# Patient Record
Sex: MALE | Race: WHITE | Employment: OTHER | URBAN - METROPOLITAN AREA
[De-identification: names, ages, dates, MRNs, and addresses within clinical notes are randomized per-mention and may not be internally consistent; named-entity substitution may affect disease eponyms.]

---

## 2023-05-04 LAB
ALBUMIN (G/DL) IN SER/PLAS: 4 G/DL (ref 3.4–5)
ANION GAP IN SER/PLAS: 10 MMOL/L (ref 10–20)
CALCIUM (MG/DL) IN SER/PLAS: 9 MG/DL (ref 8.6–10.3)
CARBON DIOXIDE, TOTAL (MMOL/L) IN SER/PLAS: 28 MMOL/L (ref 21–32)
CHLORIDE (MMOL/L) IN SER/PLAS: 106 MMOL/L (ref 98–107)
CHOLESTEROL (MG/DL) IN SER/PLAS: 156 MG/DL (ref 0–199)
CHOLESTEROL IN HDL (MG/DL) IN SER/PLAS: 38.5 MG/DL
CHOLESTEROL/HDL RATIO: 4.1
CREATININE (MG/DL) IN SER/PLAS: 0.93 MG/DL (ref 0.5–1.3)
ESTIMATED AVERAGE GLUCOSE FOR HBA1C: 128 MG/DL
GFR MALE: 90 ML/MIN/1.73M2
GLUCOSE (MG/DL) IN SER/PLAS: 112 MG/DL (ref 74–99)
HEMOGLOBIN A1C/HEMOGLOBIN TOTAL IN BLOOD: 6.1 %
LDL: 91 MG/DL (ref 0–99)
PHOSPHATE (MG/DL) IN SER/PLAS: 2.9 MG/DL (ref 2.5–4.9)
POTASSIUM (MMOL/L) IN SER/PLAS: 4.2 MMOL/L (ref 3.5–5.3)
PROSTATE SPECIFIC AG (NG/ML) IN SER/PLAS: 0.23 NG/ML (ref 0–4)
SODIUM (MMOL/L) IN SER/PLAS: 140 MMOL/L (ref 136–145)
TRIGLYCERIDE (MG/DL) IN SER/PLAS: 132 MG/DL (ref 0–149)
UREA NITROGEN (MG/DL) IN SER/PLAS: 17 MG/DL (ref 6–23)
VLDL: 26 MG/DL (ref 0–40)

## 2023-11-27 ENCOUNTER — LAB (OUTPATIENT)
Dept: LAB | Facility: LAB | Age: 68
End: 2023-11-27
Payer: MEDICARE

## 2023-11-27 DIAGNOSIS — R73.03 PREDIABETES: ICD-10-CM

## 2023-11-27 DIAGNOSIS — I25.10 ATHEROSCLEROTIC HEART DISEASE OF NATIVE CORONARY ARTERY WITHOUT ANGINA PECTORIS: ICD-10-CM

## 2023-11-27 DIAGNOSIS — E78.2 MIXED HYPERLIPIDEMIA: ICD-10-CM

## 2023-11-27 DIAGNOSIS — I10 ESSENTIAL (PRIMARY) HYPERTENSION: Primary | ICD-10-CM

## 2023-11-27 LAB
ALBUMIN SERPL BCP-MCNC: 4.1 G/DL (ref 3.4–5)
ANION GAP SERPL CALC-SCNC: 11 MMOL/L (ref 10–20)
BUN SERPL-MCNC: 19 MG/DL (ref 6–23)
CALCIUM SERPL-MCNC: 8.9 MG/DL (ref 8.6–10.3)
CHLORIDE SERPL-SCNC: 104 MMOL/L (ref 98–107)
CHOLEST SERPL-MCNC: 170 MG/DL (ref 0–199)
CHOLESTEROL/HDL RATIO: 4.7
CO2 SERPL-SCNC: 30 MMOL/L (ref 21–32)
CREAT SERPL-MCNC: 0.84 MG/DL (ref 0.5–1.3)
EST. AVERAGE GLUCOSE BLD GHB EST-MCNC: 117 MG/DL
GFR SERPL CREATININE-BSD FRML MDRD: >90 ML/MIN/1.73M*2
GLUCOSE SERPL-MCNC: 104 MG/DL (ref 74–99)
HBA1C MFR BLD: 5.7 %
HDLC SERPL-MCNC: 36.5 MG/DL
LDLC SERPL CALC-MCNC: 87 MG/DL
NON HDL CHOLESTEROL: 134 MG/DL (ref 0–149)
PHOSPHATE SERPL-MCNC: 2.8 MG/DL (ref 2.5–4.9)
POTASSIUM SERPL-SCNC: 4.3 MMOL/L (ref 3.5–5.3)
SODIUM SERPL-SCNC: 141 MMOL/L (ref 136–145)
TRIGL SERPL-MCNC: 235 MG/DL (ref 0–149)
VLDL: 47 MG/DL (ref 0–40)

## 2023-11-27 PROCEDURE — 80061 LIPID PANEL: CPT

## 2023-11-27 PROCEDURE — 83036 HEMOGLOBIN GLYCOSYLATED A1C: CPT

## 2023-11-27 PROCEDURE — 80069 RENAL FUNCTION PANEL: CPT

## 2023-11-27 PROCEDURE — 36415 COLL VENOUS BLD VENIPUNCTURE: CPT

## 2023-12-06 ENCOUNTER — TELEPHONE (OUTPATIENT)
Dept: PRIMARY CARE | Facility: CLINIC | Age: 68
End: 2023-12-06
Payer: MEDICARE

## 2023-12-06 NOTE — TELEPHONE ENCOUNTER
Patient left voicemail stating that he received his RSV vaccine and wanted a call back to verify it is in his chart.  Patient was notified that it is in chart.

## 2024-01-08 PROBLEM — D22.9 CHANGE IN MOLE: Status: ACTIVE | Noted: 2024-01-08

## 2024-01-08 PROBLEM — E78.6 LIPOPROTEIN DEFICIENCY DISORDER: Status: ACTIVE | Noted: 2024-01-08

## 2024-01-08 PROBLEM — R94.31 ECG ABNORMAL: Status: ACTIVE | Noted: 2024-01-08

## 2024-01-08 PROBLEM — F41.9 ACUTE ANXIETY: Status: ACTIVE | Noted: 2024-01-08

## 2024-01-08 PROBLEM — M25.521 RIGHT ELBOW PAIN: Status: ACTIVE | Noted: 2024-01-08

## 2024-01-08 PROBLEM — E88.810 DYSMETABOLIC SYNDROME X: Status: ACTIVE | Noted: 2024-01-08

## 2024-01-08 PROBLEM — G47.30 SLEEP APNEA: Status: ACTIVE | Noted: 2024-01-08

## 2024-01-08 PROBLEM — Z91.199 NONCOMPLIANCE WITH THERAPEUTIC PLAN: Status: ACTIVE | Noted: 2024-01-08

## 2024-01-08 PROBLEM — R42 VERTIGO: Status: ACTIVE | Noted: 2024-01-08

## 2024-01-08 PROBLEM — M54.50 LOW BACK PAIN: Status: ACTIVE | Noted: 2024-01-08

## 2024-01-08 PROBLEM — M79.606 LOWER EXTREMITY PAIN: Status: ACTIVE | Noted: 2024-01-08

## 2024-01-08 PROBLEM — M25.579 ANKLE JOINT PAIN: Status: ACTIVE | Noted: 2024-01-08

## 2024-01-08 PROBLEM — F40.9 PHOBIA: Status: ACTIVE | Noted: 2024-01-08

## 2024-01-08 PROBLEM — E66.9 OBESE: Status: ACTIVE | Noted: 2024-01-08

## 2024-01-08 PROBLEM — M21.40 ACQUIRED PES PLANUS: Status: ACTIVE | Noted: 2024-01-08

## 2024-01-08 PROBLEM — E78.2 HYPERLIPIDEMIA, MIXED: Status: ACTIVE | Noted: 2024-01-08

## 2024-01-08 PROBLEM — F42.9 OBSESSIVE COMPULSIVE DISORDER: Status: ACTIVE | Noted: 2024-01-08

## 2024-01-08 PROBLEM — D17.9 LIPOMA: Status: ACTIVE | Noted: 2024-01-08

## 2024-01-08 PROBLEM — M67.90 TENDINOPATHY: Status: ACTIVE | Noted: 2024-01-08

## 2024-01-08 PROBLEM — I10 BENIGN ESSENTIAL HYPERTENSION: Status: ACTIVE | Noted: 2024-01-08

## 2024-01-08 PROBLEM — L91.8 SKIN TAG: Status: ACTIVE | Noted: 2024-01-08

## 2024-01-08 PROBLEM — F41.1 GENERALIZED ANXIETY DISORDER: Status: ACTIVE | Noted: 2024-01-08

## 2024-01-08 PROBLEM — M76.60 ACHILLES TENDINITIS: Status: ACTIVE | Noted: 2024-01-08

## 2024-01-08 RX ORDER — NAPROXEN SODIUM 220 MG/1
1 TABLET, FILM COATED ORAL DAILY
COMMUNITY

## 2024-01-08 RX ORDER — GLUCOSAMINE/CHONDRO SU A 500-400 MG
1 TABLET ORAL DAILY
COMMUNITY

## 2024-01-08 RX ORDER — ATORVASTATIN CALCIUM 80 MG/1
80 TABLET, FILM COATED ORAL NIGHTLY
COMMUNITY
End: 2024-03-05

## 2024-01-08 RX ORDER — METOPROLOL TARTRATE 50 MG/1
50 TABLET ORAL DAILY
COMMUNITY
Start: 2022-05-23

## 2024-01-08 RX ORDER — PERPHENAZINE/AMITRIPTYLINE HCL 2 MG-25 MG
1 TABLET ORAL DAILY
COMMUNITY
End: 2024-04-02 | Stop reason: ALTCHOICE

## 2024-01-08 RX ORDER — LISINOPRIL 5 MG/1
5 TABLET ORAL DAILY
COMMUNITY
End: 2024-01-15 | Stop reason: SDUPTHER

## 2024-01-08 RX ORDER — SERTRALINE HYDROCHLORIDE 50 MG/1
50 TABLET, FILM COATED ORAL 2 TIMES DAILY
COMMUNITY
End: 2024-03-06

## 2024-01-08 RX ORDER — NAPROXEN SODIUM 220 MG/1
TABLET ORAL
COMMUNITY

## 2024-01-15 ENCOUNTER — OFFICE VISIT (OUTPATIENT)
Dept: CARDIOLOGY | Facility: CLINIC | Age: 69
End: 2024-01-15
Payer: MEDICARE

## 2024-01-15 VITALS
BODY MASS INDEX: 36.22 KG/M2 | HEART RATE: 64 BPM | DIASTOLIC BLOOD PRESSURE: 68 MMHG | WEIGHT: 253 LBS | SYSTOLIC BLOOD PRESSURE: 142 MMHG | HEIGHT: 70 IN

## 2024-01-15 DIAGNOSIS — E08.44: ICD-10-CM

## 2024-01-15 DIAGNOSIS — I10 BENIGN ESSENTIAL HYPERTENSION: ICD-10-CM

## 2024-01-15 DIAGNOSIS — E66.09 CLASS 2 OBESITY DUE TO EXCESS CALORIES WITH BODY MASS INDEX (BMI) OF 36.0 TO 36.9 IN ADULT, UNSPECIFIED WHETHER SERIOUS COMORBIDITY PRESENT: ICD-10-CM

## 2024-01-15 DIAGNOSIS — E78.2 HYPERLIPIDEMIA, MIXED: ICD-10-CM

## 2024-01-15 DIAGNOSIS — Z12.5 SCREENING FOR PROSTATE CANCER: ICD-10-CM

## 2024-01-15 PROCEDURE — 4010F ACE/ARB THERAPY RXD/TAKEN: CPT | Performed by: INTERNAL MEDICINE

## 2024-01-15 PROCEDURE — 3008F BODY MASS INDEX DOCD: CPT | Performed by: INTERNAL MEDICINE

## 2024-01-15 PROCEDURE — 3077F SYST BP >= 140 MM HG: CPT | Performed by: INTERNAL MEDICINE

## 2024-01-15 PROCEDURE — 1159F MED LIST DOCD IN RCRD: CPT | Performed by: INTERNAL MEDICINE

## 2024-01-15 PROCEDURE — 99214 OFFICE O/P EST MOD 30 MIN: CPT | Performed by: INTERNAL MEDICINE

## 2024-01-15 PROCEDURE — 3078F DIAST BP <80 MM HG: CPT | Performed by: INTERNAL MEDICINE

## 2024-01-15 PROCEDURE — 1036F TOBACCO NON-USER: CPT | Performed by: INTERNAL MEDICINE

## 2024-01-15 RX ORDER — LISINOPRIL 10 MG/1
10 TABLET ORAL DAILY
Qty: 90 TABLET | Refills: 1 | Status: SHIPPED | OUTPATIENT
Start: 2024-01-15 | End: 2024-07-13

## 2024-01-15 RX ORDER — MELOXICAM 15 MG/1
15 TABLET ORAL DAILY
COMMUNITY
Start: 2023-12-21

## 2024-01-15 ASSESSMENT — ENCOUNTER SYMPTOMS
CARDIOVASCULAR NEGATIVE: 1
CONSTITUTIONAL NEGATIVE: 1
RESPIRATORY NEGATIVE: 1
NEUROLOGICAL NEGATIVE: 1

## 2024-01-15 NOTE — PROGRESS NOTES
A1c  Referred by Dr. Hahn ref. provider found provider found for   Chief Complaint   Patient presents with    Follow-up     6 month follow up.         History of Present Illness  Jayesh Metcalf is a 68 y.o. year old male patient with history of hypertension history of hyperlipidemia.  Doing well from a cardiac standpoint no complaint no symptoms of chest pain or shortness of breath.  Denies any symptoms of palpitations syncope or presyncope.  I discussed with the patient in length his blood pressure was elevated therefore we will increase lisinopril to 10 mg p.o. daily.  He will be checking blood pressure and report back to me.  Reviewed his labs and cholesterol is adequately controlled.  He will see me back as scheduled    Past Medical History  Past Medical History:   Diagnosis Date    Atherosclerotic heart disease of native coronary artery without angina pectoris     Coronary artery disease    Ingrowing nail     IGTN (ingrowing toe nail)    Other insomnia     Secondary insomnia    Personal history of other diseases of the circulatory system     History of intermittent claudication    Personal history of other diseases of the circulatory system     History of orthostatic hypotension    Personal history of other diseases of the digestive system     History of gastroesophageal reflux (GERD)    Personal history of other diseases of the digestive system     History of IBS    Personal history of other diseases of the digestive system     History of umbilical hernia    Personal history of other diseases of the musculoskeletal system and connective tissue     History of neck pain    Personal history of other specified conditions     History of diarrhea    Personal history of other specified conditions     History of fatigue    Personal history of other specified conditions     History of nocturia    Rash and other nonspecific skin eruption     Skin rash       Social History  Social History     Tobacco Use    Smoking status:  Never    Smokeless tobacco: Never   Substance Use Topics    Alcohol use: Not Currently    Drug use: Not on file       Family History     Family History   Problem Relation Name Age of Onset    Kidney failure Mother      Other (CABG) Father         Review of Systems  As per HPI, all other systems reviewed and negative.    Allergies:  No Known Allergies     Outpatient Medications:  Current Outpatient Medications   Medication Instructions    aspirin 81 mg chewable tablet 1 tablet, oral, Daily    atorvastatin (LIPITOR) 80 mg, oral, Nightly    flaxseed-omega3,6,9-fatty acid 1,300-670-155 mg capsule 1 capsule, oral, Daily    glucosamine-chondroitin 500-400 mg tablet 1 tablet, oral, Daily    lisinopril 5 mg, oral, Daily    meloxicam (MOBIC) 15 mg, oral, Daily    metoprolol tartrate (LOPRESSOR) 50 mg, oral, Daily    omega 3-dha-epa-fish oil (Fish OiL) 1,200 (144-216) mg capsule oral    sertraline (ZOLOFT) 50 mg, oral, 2 times daily         Vitals:  Vitals:    01/15/24 1458   BP: 150/77   Pulse: 64       Physical Exam:  Physical Exam  Constitutional:       Appearance: He is obese.   Cardiovascular:      Rate and Rhythm: Normal rate and regular rhythm.      Heart sounds: No murmur heard.  Pulmonary:      Effort: Pulmonary effort is normal.      Breath sounds: Normal breath sounds.   Skin:     General: Skin is warm and dry.   Neurological:      General: No focal deficit present.      Mental Status: He is alert and oriented to person, place, and time.       Review of Systems   Constitutional: Negative.    Respiratory: Negative.     Cardiovascular: Negative.    Neurological: Negative.          Assessment/Plan   Problem List Items Addressed This Visit             ICD-10-CM    Benign essential hypertension I10    Hyperlipidemia, mixed E78.2    Obese E66.9       Scribe Attestation  By signing my name below, Thu GRIFFITH LPN  , Scribe   attest that this documentation has been prepared under the direction and in the  presence of Maria Luisa Bryson MD.     Maria Luisa Bryson MD [facility]  Interventional Cardiology   of [facility]     Thank you for allowing me to participate in the care of this patient. Please do not hesitate to contact me with any further questions or concerns.

## 2024-01-15 NOTE — H&P (VIEW-ONLY)
A1c  Referred by Dr. Hahn ref. provider found provider found for   Chief Complaint   Patient presents with    Follow-up     6 month follow up.         History of Present Illness  Jayesh Metcalf is a 68 y.o. year old male patient with history of hypertension history of hyperlipidemia.  Doing well from a cardiac standpoint no complaint no symptoms of chest pain or shortness of breath.  Denies any symptoms of palpitations syncope or presyncope.  I discussed with the patient in length his blood pressure was elevated therefore we will increase lisinopril to 10 mg p.o. daily.  He will be checking blood pressure and report back to me.  Reviewed his labs and cholesterol is adequately controlled.  He will see me back as scheduled    Past Medical History  Past Medical History:   Diagnosis Date    Atherosclerotic heart disease of native coronary artery without angina pectoris     Coronary artery disease    Ingrowing nail     IGTN (ingrowing toe nail)    Other insomnia     Secondary insomnia    Personal history of other diseases of the circulatory system     History of intermittent claudication    Personal history of other diseases of the circulatory system     History of orthostatic hypotension    Personal history of other diseases of the digestive system     History of gastroesophageal reflux (GERD)    Personal history of other diseases of the digestive system     History of IBS    Personal history of other diseases of the digestive system     History of umbilical hernia    Personal history of other diseases of the musculoskeletal system and connective tissue     History of neck pain    Personal history of other specified conditions     History of diarrhea    Personal history of other specified conditions     History of fatigue    Personal history of other specified conditions     History of nocturia    Rash and other nonspecific skin eruption     Skin rash       Social History  Social History     Tobacco Use    Smoking status:  Never    Smokeless tobacco: Never   Substance Use Topics    Alcohol use: Not Currently    Drug use: Not on file       Family History     Family History   Problem Relation Name Age of Onset    Kidney failure Mother      Other (CABG) Father         Review of Systems  As per HPI, all other systems reviewed and negative.    Allergies:  No Known Allergies     Outpatient Medications:  Current Outpatient Medications   Medication Instructions    aspirin 81 mg chewable tablet 1 tablet, oral, Daily    atorvastatin (LIPITOR) 80 mg, oral, Nightly    flaxseed-omega3,6,9-fatty acid 1,300-670-155 mg capsule 1 capsule, oral, Daily    glucosamine-chondroitin 500-400 mg tablet 1 tablet, oral, Daily    lisinopril 5 mg, oral, Daily    meloxicam (MOBIC) 15 mg, oral, Daily    metoprolol tartrate (LOPRESSOR) 50 mg, oral, Daily    omega 3-dha-epa-fish oil (Fish OiL) 1,200 (144-216) mg capsule oral    sertraline (ZOLOFT) 50 mg, oral, 2 times daily         Vitals:  Vitals:    01/15/24 1458   BP: 150/77   Pulse: 64       Physical Exam:  Physical Exam  Constitutional:       Appearance: He is obese.   Cardiovascular:      Rate and Rhythm: Normal rate and regular rhythm.      Heart sounds: No murmur heard.  Pulmonary:      Effort: Pulmonary effort is normal.      Breath sounds: Normal breath sounds.   Skin:     General: Skin is warm and dry.   Neurological:      General: No focal deficit present.      Mental Status: He is alert and oriented to person, place, and time.       Review of Systems   Constitutional: Negative.    Respiratory: Negative.     Cardiovascular: Negative.    Neurological: Negative.          Assessment/Plan   Problem List Items Addressed This Visit             ICD-10-CM    Benign essential hypertension I10    Hyperlipidemia, mixed E78.2    Obese E66.9       Scribe Attestation  By signing my name below, Thu GRIFFITH LPN  , Scribe   attest that this documentation has been prepared under the direction and in the  presence of Maria Luisa Bryson MD.     Maria Luisa Bryson MD [facility]  Interventional Cardiology   of [facility]     Thank you for allowing me to participate in the care of this patient. Please do not hesitate to contact me with any further questions or concerns.

## 2024-02-07 ENCOUNTER — TELEPHONE (OUTPATIENT)
Dept: CARDIOLOGY | Facility: CLINIC | Age: 69
End: 2024-02-07

## 2024-02-07 ENCOUNTER — LAB (OUTPATIENT)
Dept: LAB | Facility: LAB | Age: 69
End: 2024-02-07
Payer: MEDICARE

## 2024-02-07 ENCOUNTER — PRE-ADMISSION TESTING (OUTPATIENT)
Dept: PREADMISSION TESTING | Facility: HOSPITAL | Age: 69
End: 2024-02-07
Payer: MEDICARE

## 2024-02-07 VITALS
SYSTOLIC BLOOD PRESSURE: 121 MMHG | HEART RATE: 51 BPM | WEIGHT: 238.76 LBS | DIASTOLIC BLOOD PRESSURE: 60 MMHG | OXYGEN SATURATION: 98 % | HEIGHT: 69 IN | TEMPERATURE: 97.5 F | BODY MASS INDEX: 35.36 KG/M2 | RESPIRATION RATE: 16 BRPM

## 2024-02-07 DIAGNOSIS — Z01.818 PRE-OP TESTING: ICD-10-CM

## 2024-02-07 DIAGNOSIS — Z01.818 PRE-OP TESTING: Primary | ICD-10-CM

## 2024-02-07 LAB
ANION GAP SERPL CALC-SCNC: 8 MMOL/L (ref 10–20)
BUN SERPL-MCNC: 14 MG/DL (ref 6–23)
CALCIUM SERPL-MCNC: 9.5 MG/DL (ref 8.6–10.3)
CHLORIDE SERPL-SCNC: 104 MMOL/L (ref 98–107)
CO2 SERPL-SCNC: 31 MMOL/L (ref 21–32)
CREAT SERPL-MCNC: 0.77 MG/DL (ref 0.5–1.3)
EGFRCR SERPLBLD CKD-EPI 2021: >90 ML/MIN/1.73M*2
GLUCOSE SERPL-MCNC: 101 MG/DL (ref 74–99)
POTASSIUM SERPL-SCNC: 4.5 MMOL/L (ref 3.5–5.3)
SODIUM SERPL-SCNC: 138 MMOL/L (ref 136–145)

## 2024-02-07 PROCEDURE — 80048 BASIC METABOLIC PNL TOTAL CA: CPT

## 2024-02-07 PROCEDURE — 99203 OFFICE O/P NEW LOW 30 MIN: CPT | Performed by: NURSE PRACTITIONER

## 2024-02-07 PROCEDURE — 36415 COLL VENOUS BLD VENIPUNCTURE: CPT

## 2024-02-07 ASSESSMENT — CHADS2 SCORE
HYPERTENSION: YES
AGE GREATER THAN OR EQUAL TO 75: NO
PRIOR STROKE OR TIA OR THROMBOEMBOLISM: NO
CHF: NO
CHADS2 SCORE: 1
DIABETES: NO

## 2024-02-07 ASSESSMENT — ENCOUNTER SYMPTOMS
NECK NEGATIVE: 1
NEUROLOGICAL NEGATIVE: 1
GASTROINTESTINAL NEGATIVE: 1
ENDOCRINE NEGATIVE: 1
EYES NEGATIVE: 1
CONSTITUTIONAL NEGATIVE: 1

## 2024-02-07 ASSESSMENT — DUKE ACTIVITY SCORE INDEX (DASI)
CAN YOU DO LIGHT WORK AROUND THE HOUSE LIKE DUSTING OR WASHING DISHES: YES
CAN YOU HAVE SEXUAL RELATIONS: YES
CAN YOU PARTICIPATE IN MODERATE RECREATIONAL ACTIVITIES LIKE GOLF, BOWLING, DANCING, DOUBLES TENNIS OR THROWING A BASEBALL OR FOOTBALL: YES
CAN YOU WALK INDOORS, SUCH AS AROUND YOUR HOUSE: YES
CAN YOU DO YARD WORK LIKE RAKING LEAVES, WEEDING OR PUSHING A MOWER: YES
CAN YOU RUN A SHORT DISTANCE: YES
CAN YOU CLIMB A FLIGHT OF STAIRS OR WALK UP A HILL: YES
CAN YOU DO MODERATE WORK AROUND THE HOUSE LIKE VACUUMING, SWEEPING FLOORS OR CARRYING GROCERIES: YES
TOTAL_SCORE: 50.7
DASI METS SCORE: 9
CAN YOU WALK A BLOCK OR TWO ON LEVEL GROUND: YES
CAN YOU DO HEAVY WORK AROUND THE HOUSE LIKE SCRUBBING FLOORS OR LIFTING AND MOVING HEAVY FURNITURE: YES
CAN YOU PARTICIPATE IN STRENOUS SPORTS LIKE SWIMMING, SINGLES TENNIS, FOOTBALL, BASKETBALL, OR SKIING: NO
CAN YOU TAKE CARE OF YOURSELF (EAT, DRESS, BATHE, OR USE TOILET): YES

## 2024-02-07 ASSESSMENT — LIFESTYLE VARIABLES: SMOKING_STATUS: NONSMOKER

## 2024-02-07 ASSESSMENT — ACTIVITIES OF DAILY LIVING (ADL): ADL_SCORE: 0

## 2024-02-07 ASSESSMENT — PAIN SCALES - GENERAL: PAINLEVEL_OUTOF10: 0 - NO PAIN

## 2024-02-07 ASSESSMENT — PAIN - FUNCTIONAL ASSESSMENT: PAIN_FUNCTIONAL_ASSESSMENT: 0-10

## 2024-02-07 NOTE — TELEPHONE ENCOUNTER
Patient is having a Left Arthroscopic Knee Surgery; Partial Medial Meniscectomy next Wed 2/14. During Pre-Admission testing today they told him to check with  about whether he should stop taking his Aspirin 81mg. Patient did not take his Asprin today and would like to know if he should continue to refrain from taking it until after his surgery or if he should continue taking it until closer to the surgery date.     Patient also wanted to let  know that doubling his Lisinopril dosage has been working well. BP reading taken today was 121/60, has not had a reading over 140 since change in dosage.

## 2024-02-07 NOTE — CPM/PAT H&P
CPM/PAT Evaluation       Name: Jayesh Metcalf (Jayesh Metcalf)  /Age: 1955/68 y.o.     In-Person       Chief Complaint: left knee pain    HPI  This is a very pleasant 67 yo with PMHx of HLD, GERD, HTN, and torn left knee meniscus.  Pt states he has significant knee pain off and on.  He did get some relief with Meloxicam.   He denies recent fever or chills.    Past Medical History:   Diagnosis Date    Atherosclerotic heart disease of native coronary artery without angina pectoris     Coronary artery disease    Heart disease     Ingrowing nail     IGTN (ingrowing toe nail)    Other insomnia     Secondary insomnia    Personal history of other diseases of the circulatory system     History of intermittent claudication    Personal history of other diseases of the circulatory system     History of orthostatic hypotension    Personal history of other diseases of the digestive system     History of gastroesophageal reflux (GERD)    Personal history of other diseases of the digestive system     History of IBS    Personal history of other diseases of the digestive system     History of umbilical hernia    Personal history of other diseases of the musculoskeletal system and connective tissue     History of neck pain    Personal history of other specified conditions     History of diarrhea    Personal history of other specified conditions     History of fatigue    Personal history of other specified conditions     History of nocturia    Rash and other nonspecific skin eruption     Skin rash       Past Surgical History:   Procedure Laterality Date    CARDIAC CATHETERIZATION      COLONOSCOPY      CORONARY STENT PLACEMENT      OTHER SURGICAL HISTORY  2015    Surgery    OTHER SURGICAL HISTORY  2021    Complete colonoscopy    OTHER SURGICAL HISTORY  2021    Percutaneous transluminal coronary angioplasty    OTHER SURGICAL HISTORY  2021    Umbilical hernia repair       Patient  reports being sexually  active.    Family History   Problem Relation Name Age of Onset    Kidney failure Mother      Other (CABG) Father         No Known Allergies    Prior to Admission medications    Medication Sig Start Date End Date Taking? Authorizing Provider   aspirin 81 mg chewable tablet Chew 1 tablet (81 mg) once daily.    Historical Provider, MD   atorvastatin (Lipitor) 80 mg tablet Take 1 tablet (80 mg) by mouth once daily at bedtime.    Historical Provider, MD   flaxseed-omega3,6,9-fatty acid 1,300-670-155 mg capsule Take 1 capsule by mouth once daily.    Historical Provider, MD   glucosamine-chondroitin 500-400 mg tablet Take 1 tablet by mouth once daily.    Historical Provider, MD   lisinopril 10 mg tablet Take 1 tablet (10 mg) by mouth once daily. 1/15/24 7/13/24  Maria Luisa Bryson MD   meloxicam (Mobic) 15 mg tablet Take 1 tablet (15 mg) by mouth once daily. 12/21/23   Historical Provider, MD   metoprolol tartrate (Lopressor) 50 mg tablet Take 1 tablet by mouth once daily. 5/23/22   Historical Provider, MD   multivitamin with minerals iron-free (Centrum Silver) Take 1 tablet by mouth once daily.    Historical Provider, MD   omega 3-dha-epa-fish oil (Fish OiL) 1,200 (144-216) mg capsule Take by mouth.    Historical Provider, MD   sertraline (Zoloft) 50 mg tablet Take 1 tablet (50 mg) by mouth 2 times a day.    Historical Provider, MD [place] ROS:   Constitutional:   neg    Neuro/Psych:   neg    Eyes:   neg    Ears:   neg    Nose:   neg    Mouth:   neg    Throat:   neg    Neck:   neg    Cardio:    Hx of normal Lexiscan 1/2023  Stents placed pt was age 48  Respiratory:    Possible sleep apnea pt had test in past but felt study was inaccurate pt doesn't know the results he felt like  he didn't sleep for sutdy  Endocrine:   neg    GI:   neg    :   neg    Musculoskeletal:    Left knee pain   MRI of knee shows left meniscus tear  Hematologic:   neg    Skin:  neg        Physical Exam  Constitutional:       Appearance: Normal  appearance.   HENT:      Head: Normocephalic and atraumatic.      Nose: Nose normal.      Mouth/Throat:      Mouth: Mucous membranes are moist.   Eyes:      Pupils: Pupils are equal, round, and reactive to light.   Cardiovascular:      Rate and Rhythm: Normal rate and regular rhythm.   Pulmonary:      Effort: Pulmonary effort is normal.      Breath sounds: Normal breath sounds.   Abdominal:      General: Bowel sounds are normal.      Palpations: Abdomen is soft.   Musculoskeletal:      Cervical back: Normal range of motion.      Comments: No ankle edema   Skin:     General: Skin is warm and dry.   Neurological:      General: No focal deficit present.      Mental Status: He is alert and oriented to person, place, and time.   Psychiatric:         Mood and Affect: Mood normal.         Behavior: Behavior normal.          Airway full ROM  Anesthesia:  Patient denies any anesthesia complications.   Teeth:  intact  ECG: SB rate of 47  Lab Results   Component Value Date    GLUCOSE 101 (H) 02/07/2024    CALCIUM 9.5 02/07/2024     02/07/2024    K 4.5 02/07/2024    CO2 31 02/07/2024     02/07/2024    BUN 14 02/07/2024    CREATININE 0.77 02/07/2024         Visit Vitals  /60   Pulse 51   Temp 36.4 °C (97.5 °F) (Temporal)   Resp 16       DASI Risk Score      Flowsheet Row Most Recent Value   DASI SCORE 50.7   METS Score (Will be calculated only when all the questions are answered) 9          Caprini DVT Assessment      Flowsheet Row Most Recent Value   DVT Score 5   Age 60-75 years   BMI 31-40 (Obesity)          Modified Frailty Index      Flowsheet Row Most Recent Value   Modified Frailty Index Calculator 0          CHADS2 Stroke Risk  Current as of 17 minutes ago        N/A 3 - 100%: High Risk   2 - 3%: Medium Risk   0 - 2%: Low Risk     Last Change: N/A          This score determines the patient's risk of having a stroke if the patient has atrial fibrillation.        This score is not applicable to this  patient. Components are not calculated.          Revised Cardiac Risk Index      Flowsheet Row Most Recent Value   Revised Cardiac Risk Calculator 0          Apfel Simplified Score      Flowsheet Row Most Recent Value   Apfel Simplified Score Calculator 2          Risk Analysis Index Results This Encounter         2/7/2024  0938             MUKHERJEE Cancer History: Patient does not indicate history of cancer    Total Risk Analysis Index Score Without Cancer: 23    Total Risk Analysis Index Score: 23          Stop Bang Score      Flowsheet Row Most Recent Value   Do you snore loudly? 0   Do you often feel tired or fatigued after your sleep? 0   Has anyone ever observed you stop breathing in your sleep? 0   Do you have or are you being treated for high blood pressure? 0   Recent BMI (Calculated) 36.3   Is BMI greater than 35 kg/m2? 1=Yes   Age older than 50 years old? 1=Yes   Is your neck circumference greater than 17 inches (Male) or 16 inches (Female)? 0   Gender - Male 1=Yes   STOP-BANG Total Score 3            Assessment and Plan:     Plan for OR on 2/14 for LEFT ARTHROSCOPIC KNEE SURGERY; PARTIAL MEDIAL MENISCECTOMY   BMP

## 2024-02-07 NOTE — PREPROCEDURE INSTRUCTIONS
Medication List            Accurate as of February 7, 2024 10:20 AM. Always use your most recent med list.                aspirin 81 mg chewable tablet  Notes to patient: Discuss with Dr Bryson if 7 days OK     atorvastatin 80 mg tablet  Commonly known as: Lipitor  Medication Adjustments for Surgery: Continue until night before surgery     Fish OiL 1,200 (144-216) mg capsule  Generic drug: omega 3-dha-epa-fish oil  Medication Adjustments for Surgery: Stop 7 days before surgery     flaxseed-omega3,6,9-fatty acid 1,300-670-155 mg capsule  Medication Adjustments for Surgery: Stop 7 days before surgery     glucosamine-chondroitin 500-400 mg tablet  Medication Adjustments for Surgery: Stop 7 days before surgery     lisinopril 10 mg tablet  Take 1 tablet (10 mg) by mouth once daily.  Medication Adjustments for Surgery: Stop 1 day before surgery     meloxicam 15 mg tablet  Commonly known as: Mobic  Medication Adjustments for Surgery: Stop 7 days before surgery     metoprolol tartrate 50 mg tablet  Commonly known as: Lopressor  Medication Adjustments for Surgery: Take morning of surgery with sip of water, no other fluids     multivitamin with minerals iron-free  Commonly known as: Centrum Silver  Medication Adjustments for Surgery: Stop 7 days before surgery     sertraline 50 mg tablet  Commonly known as: Zoloft  Medication Adjustments for Surgery: Take morning of surgery with sip of water, no other fluids          PRE-OPERATIVE INSTRUCTIONS    You will receive notification one business day prior to your surgery to confirm your arrival time and additional information. It is important that you answer your phone and/or check your messages during this time.    Please enter the building through the Outpatient entrance. Take the elevator off the lobby to the 2nd floor and check in at the Outpatient Surgery desk    INSTRUCTIONS:  Talk to your surgeon for instructions if you should stop your aspirin, blood thinner, or diabetes  medicines.  DO NOT take any multivitamins or over the counter supplements for 7-10 days before surgery.  If not being admitted, you must have an adult immediately available to drive you home after surgery. We also highly recommend you have someone stay with you for the entire day and night of your surgery.  For children having surgery, a parent or legal guardian must accompany them to the surgery center. If this is not possible, please call [phone] to make additional arrangements.  For adults who are unable to consent or make medical decisions for themselves, a legal guardian or Power of  must accompany them to the surgery center. If this is not possible, please call [phone] to make additional arrangements.  Wear comfortable, loose fitting clothing.  All jewelry and piercings must be removed. If you are unable to remove an item or have a dermal piercing, please be sure to tell the nurse when you arrive for surgery.  Nail polish and make-up must be removed.  Avoid smoking or consuming alcohol for 24 hours before surgery.  To help prevent infection, please take a shower/bath and wash your hair the night before and/or morning of surgery.    Additional instructions about eating and drinking before surgery:  Do not eat any solid foods after midnight. Milk, nutritional drinks/supplements, and infant formula are considered solid foods.  You may drink up to 12 oz. of clear liquids up to 2 hours before your arrival time for surgery, unless directed otherwise by your surgeon. Clear liquids include water, non-carbonated sports drinks (Gatorade), black tea or coffee (no creamers) and breast milk.    If you received a blue folder, please review additional information provided inside the folder regarding additional preparation.     If you have any questions or concerns, please call Pre-Admission Testing at [phone].    Preoperative Bathing instructions    Follow these instructions the evening before and  morning of surgery:  Do not shave the day before or day of surgery.  Remove all jewelry until after surgery. Take off rings and take out all body-piercing jewelry.  Use a clean wash cloth and towel.  Wash your face and hair with your normal soap and shampoo before you use the CHG soap.  Use a wash cloth to clean your skin with the CHG soap. Use enough CHG soap to cover the skin on your whole body. Use the same amount as you would with your normal soap.  Do not use the CHG soap on your face, eyes, ears, or head.  Do not scrub your skin too hard.  Be sure to clean the area well where surgery will be done.  Do not wash with your normal soap after the CHG soap.  Keep away from the water stream when you put CHG soap on. This keeps it from rinsing off too soon.  Rinse your body well.  Pat yourself dry with a clean, soft towel.  Put on clean clothing.  Do not put on any deodorants, lotions, or oils after showering. These might block how the CHG soap works.

## 2024-02-07 NOTE — H&P (VIEW-ONLY)
CPM/PAT Evaluation       Name: Jayesh Metcalf (Jayesh Metcalf)  /Age: 1955/68 y.o.     In-Person       Chief Complaint: left knee pain    HPI  This is a very pleasant 69 yo with PMHx of HLD, GERD, HTN, and torn left knee meniscus.  Pt states he has significant knee pain off and on.  He did get some relief with Meloxicam.   He denies recent fever or chills.    Past Medical History:   Diagnosis Date    Atherosclerotic heart disease of native coronary artery without angina pectoris     Coronary artery disease    Heart disease     Ingrowing nail     IGTN (ingrowing toe nail)    Other insomnia     Secondary insomnia    Personal history of other diseases of the circulatory system     History of intermittent claudication    Personal history of other diseases of the circulatory system     History of orthostatic hypotension    Personal history of other diseases of the digestive system     History of gastroesophageal reflux (GERD)    Personal history of other diseases of the digestive system     History of IBS    Personal history of other diseases of the digestive system     History of umbilical hernia    Personal history of other diseases of the musculoskeletal system and connective tissue     History of neck pain    Personal history of other specified conditions     History of diarrhea    Personal history of other specified conditions     History of fatigue    Personal history of other specified conditions     History of nocturia    Rash and other nonspecific skin eruption     Skin rash       Past Surgical History:   Procedure Laterality Date    CARDIAC CATHETERIZATION      COLONOSCOPY      CORONARY STENT PLACEMENT      OTHER SURGICAL HISTORY  2015    Surgery    OTHER SURGICAL HISTORY  2021    Complete colonoscopy    OTHER SURGICAL HISTORY  2021    Percutaneous transluminal coronary angioplasty    OTHER SURGICAL HISTORY  2021    Umbilical hernia repair       Patient  reports being sexually  active.    Family History   Problem Relation Name Age of Onset    Kidney failure Mother      Other (CABG) Father         No Known Allergies    Prior to Admission medications    Medication Sig Start Date End Date Taking? Authorizing Provider   aspirin 81 mg chewable tablet Chew 1 tablet (81 mg) once daily.    Historical Provider, MD   atorvastatin (Lipitor) 80 mg tablet Take 1 tablet (80 mg) by mouth once daily at bedtime.    Historical Provider, MD   flaxseed-omega3,6,9-fatty acid 1,300-670-155 mg capsule Take 1 capsule by mouth once daily.    Historical Provider, MD   glucosamine-chondroitin 500-400 mg tablet Take 1 tablet by mouth once daily.    Historical Provider, MD   lisinopril 10 mg tablet Take 1 tablet (10 mg) by mouth once daily. 1/15/24 7/13/24  Maria Luisa Bryson MD   meloxicam (Mobic) 15 mg tablet Take 1 tablet (15 mg) by mouth once daily. 12/21/23   Historical Provider, MD   metoprolol tartrate (Lopressor) 50 mg tablet Take 1 tablet by mouth once daily. 5/23/22   Historical Provider, MD   multivitamin with minerals iron-free (Centrum Silver) Take 1 tablet by mouth once daily.    Historical Provider, MD   omega 3-dha-epa-fish oil (Fish OiL) 1,200 (144-216) mg capsule Take by mouth.    Historical Provider, MD   sertraline (Zoloft) 50 mg tablet Take 1 tablet (50 mg) by mouth 2 times a day.    Historical Provider, MD [place] ROS:   Constitutional:   neg    Neuro/Psych:   neg    Eyes:   neg    Ears:   neg    Nose:   neg    Mouth:   neg    Throat:   neg    Neck:   neg    Cardio:    Hx of normal Lexiscan 1/2023  Stents placed pt was age 48  Respiratory:    Possible sleep apnea pt had test in past but felt study was inaccurate pt doesn't know the results he felt like  he didn't sleep for sutdy  Endocrine:   neg    GI:   neg    :   neg    Musculoskeletal:    Left knee pain   MRI of knee shows left meniscus tear  Hematologic:   neg    Skin:  neg        Physical Exam  Constitutional:       Appearance: Normal  appearance.   HENT:      Head: Normocephalic and atraumatic.      Nose: Nose normal.      Mouth/Throat:      Mouth: Mucous membranes are moist.   Eyes:      Pupils: Pupils are equal, round, and reactive to light.   Cardiovascular:      Rate and Rhythm: Normal rate and regular rhythm.   Pulmonary:      Effort: Pulmonary effort is normal.      Breath sounds: Normal breath sounds.   Abdominal:      General: Bowel sounds are normal.      Palpations: Abdomen is soft.   Musculoskeletal:      Cervical back: Normal range of motion.      Comments: No ankle edema   Skin:     General: Skin is warm and dry.   Neurological:      General: No focal deficit present.      Mental Status: He is alert and oriented to person, place, and time.   Psychiatric:         Mood and Affect: Mood normal.         Behavior: Behavior normal.          Airway full ROM  Anesthesia:  Patient denies any anesthesia complications.   Teeth:  intact  ECG: SB rate of 47  Lab Results   Component Value Date    GLUCOSE 101 (H) 02/07/2024    CALCIUM 9.5 02/07/2024     02/07/2024    K 4.5 02/07/2024    CO2 31 02/07/2024     02/07/2024    BUN 14 02/07/2024    CREATININE 0.77 02/07/2024         Visit Vitals  /60   Pulse 51   Temp 36.4 °C (97.5 °F) (Temporal)   Resp 16       DASI Risk Score      Flowsheet Row Most Recent Value   DASI SCORE 50.7   METS Score (Will be calculated only when all the questions are answered) 9          Caprini DVT Assessment      Flowsheet Row Most Recent Value   DVT Score 5   Age 60-75 years   BMI 31-40 (Obesity)          Modified Frailty Index      Flowsheet Row Most Recent Value   Modified Frailty Index Calculator 0          CHADS2 Stroke Risk  Current as of 17 minutes ago        N/A 3 - 100%: High Risk   2 - 3%: Medium Risk   0 - 2%: Low Risk     Last Change: N/A          This score determines the patient's risk of having a stroke if the patient has atrial fibrillation.        This score is not applicable to this  patient. Components are not calculated.          Revised Cardiac Risk Index      Flowsheet Row Most Recent Value   Revised Cardiac Risk Calculator 0          Apfel Simplified Score      Flowsheet Row Most Recent Value   Apfel Simplified Score Calculator 2          Risk Analysis Index Results This Encounter         2/7/2024  0938             MUKHERJEE Cancer History: Patient does not indicate history of cancer    Total Risk Analysis Index Score Without Cancer: 23    Total Risk Analysis Index Score: 23          Stop Bang Score      Flowsheet Row Most Recent Value   Do you snore loudly? 0   Do you often feel tired or fatigued after your sleep? 0   Has anyone ever observed you stop breathing in your sleep? 0   Do you have or are you being treated for high blood pressure? 0   Recent BMI (Calculated) 36.3   Is BMI greater than 35 kg/m2? 1=Yes   Age older than 50 years old? 1=Yes   Is your neck circumference greater than 17 inches (Male) or 16 inches (Female)? 0   Gender - Male 1=Yes   STOP-BANG Total Score 3            Assessment and Plan:     Plan for OR on 2/14 for LEFT ARTHROSCOPIC KNEE SURGERY; PARTIAL MEDIAL MENISCECTOMY   BMP

## 2024-02-14 ENCOUNTER — HOSPITAL ENCOUNTER (OUTPATIENT)
Facility: HOSPITAL | Age: 69
Setting detail: OUTPATIENT SURGERY
Discharge: HOME | End: 2024-02-14
Attending: ORTHOPAEDIC SURGERY | Admitting: ORTHOPAEDIC SURGERY
Payer: MEDICARE

## 2024-02-14 ENCOUNTER — ANESTHESIA (OUTPATIENT)
Dept: OPERATING ROOM | Facility: HOSPITAL | Age: 69
End: 2024-02-14
Payer: MEDICARE

## 2024-02-14 ENCOUNTER — ANESTHESIA EVENT (OUTPATIENT)
Dept: OPERATING ROOM | Facility: HOSPITAL | Age: 69
End: 2024-02-14
Payer: MEDICARE

## 2024-02-14 VITALS
HEIGHT: 69 IN | RESPIRATION RATE: 15 BRPM | WEIGHT: 250 LBS | HEART RATE: 68 BPM | DIASTOLIC BLOOD PRESSURE: 75 MMHG | BODY MASS INDEX: 37.03 KG/M2 | OXYGEN SATURATION: 98 % | SYSTOLIC BLOOD PRESSURE: 167 MMHG | TEMPERATURE: 97.7 F

## 2024-02-14 DIAGNOSIS — S83.242D ACUTE MEDIAL MENISCUS TEAR OF LEFT KNEE, SUBSEQUENT ENCOUNTER: Primary | ICD-10-CM

## 2024-02-14 PROBLEM — I25.10 CAD (CORONARY ARTERY DISEASE): Status: ACTIVE | Noted: 2024-02-14

## 2024-02-14 PROBLEM — Z95.5 STENTED CORONARY ARTERY: Status: ACTIVE | Noted: 2024-02-14

## 2024-02-14 PROCEDURE — 2500000005 HC RX 250 GENERAL PHARMACY W/O HCPCS: Performed by: ORTHOPAEDIC SURGERY

## 2024-02-14 PROCEDURE — 2500000005 HC RX 250 GENERAL PHARMACY W/O HCPCS: Performed by: NURSE ANESTHETIST, CERTIFIED REGISTERED

## 2024-02-14 PROCEDURE — 2500000004 HC RX 250 GENERAL PHARMACY W/ HCPCS (ALT 636 FOR OP/ED): Performed by: ORTHOPAEDIC SURGERY

## 2024-02-14 PROCEDURE — 3600000009 HC OR TIME - EACH INCREMENTAL 1 MINUTE - PROCEDURE LEVEL FOUR: Performed by: ORTHOPAEDIC SURGERY

## 2024-02-14 PROCEDURE — A29881 PR KNEE SCOPE,MED/LAT MENISECTOMY: Performed by: NURSE ANESTHETIST, CERTIFIED REGISTERED

## 2024-02-14 PROCEDURE — 3700000002 HC GENERAL ANESTHESIA TIME - EACH INCREMENTAL 1 MINUTE: Performed by: ORTHOPAEDIC SURGERY

## 2024-02-14 PROCEDURE — 2500000004 HC RX 250 GENERAL PHARMACY W/ HCPCS (ALT 636 FOR OP/ED): Performed by: NURSE ANESTHETIST, CERTIFIED REGISTERED

## 2024-02-14 PROCEDURE — 7100000001 HC RECOVERY ROOM TIME - INITIAL BASE CHARGE: Performed by: ORTHOPAEDIC SURGERY

## 2024-02-14 PROCEDURE — 2720000007 HC OR 272 NO HCPCS: Performed by: ORTHOPAEDIC SURGERY

## 2024-02-14 PROCEDURE — 7100000010 HC PHASE TWO TIME - EACH INCREMENTAL 1 MINUTE: Performed by: ORTHOPAEDIC SURGERY

## 2024-02-14 PROCEDURE — 3600000004 HC OR TIME - INITIAL BASE CHARGE - PROCEDURE LEVEL FOUR: Performed by: ORTHOPAEDIC SURGERY

## 2024-02-14 PROCEDURE — 3700000001 HC GENERAL ANESTHESIA TIME - INITIAL BASE CHARGE: Performed by: ORTHOPAEDIC SURGERY

## 2024-02-14 PROCEDURE — A29881 PR KNEE SCOPE,MED/LAT MENISECTOMY: Performed by: STUDENT IN AN ORGANIZED HEALTH CARE EDUCATION/TRAINING PROGRAM

## 2024-02-14 PROCEDURE — 7100000002 HC RECOVERY ROOM TIME - EACH INCREMENTAL 1 MINUTE: Performed by: ORTHOPAEDIC SURGERY

## 2024-02-14 PROCEDURE — 7100000009 HC PHASE TWO TIME - INITIAL BASE CHARGE: Performed by: ORTHOPAEDIC SURGERY

## 2024-02-14 RX ORDER — CEFAZOLIN SODIUM 2 G/100ML
INJECTION, SOLUTION INTRAVENOUS AS NEEDED
Status: DISCONTINUED | OUTPATIENT
Start: 2024-02-14 | End: 2024-02-14

## 2024-02-14 RX ORDER — MORPHINE SULFATE 1 MG/ML
INJECTION, SOLUTION EPIDURAL; INTRATHECAL; INTRAVENOUS AS NEEDED
Status: DISCONTINUED | OUTPATIENT
Start: 2024-02-14 | End: 2024-02-14 | Stop reason: HOSPADM

## 2024-02-14 RX ORDER — PROPOFOL 10 MG/ML
INJECTION, EMULSION INTRAVENOUS CONTINUOUS PRN
Status: DISCONTINUED | OUTPATIENT
Start: 2024-02-14 | End: 2024-02-14

## 2024-02-14 RX ORDER — CEFAZOLIN SODIUM 2 G/100ML
2 INJECTION, SOLUTION INTRAVENOUS EVERY 8 HOURS
Status: CANCELLED | OUTPATIENT
Start: 2024-02-14

## 2024-02-14 RX ORDER — HYDROMORPHONE HYDROCHLORIDE 1 MG/ML
0.5 INJECTION, SOLUTION INTRAMUSCULAR; INTRAVENOUS; SUBCUTANEOUS EVERY 5 MIN PRN
Status: DISCONTINUED | OUTPATIENT
Start: 2024-02-14 | End: 2024-02-14 | Stop reason: HOSPADM

## 2024-02-14 RX ORDER — SODIUM CHLORIDE, SODIUM LACTATE, POTASSIUM CHLORIDE, CALCIUM CHLORIDE 600; 310; 30; 20 MG/100ML; MG/100ML; MG/100ML; MG/100ML
100 INJECTION, SOLUTION INTRAVENOUS CONTINUOUS
Status: DISCONTINUED | OUTPATIENT
Start: 2024-02-14 | End: 2024-02-14 | Stop reason: HOSPADM

## 2024-02-14 RX ORDER — LIDOCAINE HYDROCHLORIDE 10 MG/ML
0.1 INJECTION, SOLUTION EPIDURAL; INFILTRATION; INTRACAUDAL; PERINEURAL ONCE
Status: DISCONTINUED | OUTPATIENT
Start: 2024-02-14 | End: 2024-02-14 | Stop reason: HOSPADM

## 2024-02-14 RX ORDER — MEPERIDINE HYDROCHLORIDE 50 MG/ML
12.5 INJECTION INTRAMUSCULAR; INTRAVENOUS; SUBCUTANEOUS EVERY 10 MIN PRN
Status: DISCONTINUED | OUTPATIENT
Start: 2024-02-14 | End: 2024-02-14 | Stop reason: HOSPADM

## 2024-02-14 RX ORDER — ONDANSETRON HYDROCHLORIDE 2 MG/ML
INJECTION, SOLUTION INTRAVENOUS AS NEEDED
Status: DISCONTINUED | OUTPATIENT
Start: 2024-02-14 | End: 2024-02-14

## 2024-02-14 RX ORDER — FENTANYL CITRATE 50 UG/ML
25 INJECTION, SOLUTION INTRAMUSCULAR; INTRAVENOUS EVERY 5 MIN PRN
Status: DISCONTINUED | OUTPATIENT
Start: 2024-02-14 | End: 2024-02-14 | Stop reason: HOSPADM

## 2024-02-14 RX ORDER — FENTANYL CITRATE 50 UG/ML
INJECTION, SOLUTION INTRAMUSCULAR; INTRAVENOUS AS NEEDED
Status: DISCONTINUED | OUTPATIENT
Start: 2024-02-14 | End: 2024-02-14

## 2024-02-14 RX ORDER — LIDOCAINE HYDROCHLORIDE 20 MG/ML
INJECTION, SOLUTION INFILTRATION; PERINEURAL AS NEEDED
Status: DISCONTINUED | OUTPATIENT
Start: 2024-02-14 | End: 2024-02-14

## 2024-02-14 RX ORDER — OXYCODONE HYDROCHLORIDE 5 MG/1
5 TABLET ORAL EVERY 4 HOURS PRN
Status: DISCONTINUED | OUTPATIENT
Start: 2024-02-14 | End: 2024-02-14 | Stop reason: HOSPADM

## 2024-02-14 RX ORDER — CELECOXIB 200 MG/1
200 CAPSULE ORAL 2 TIMES DAILY
Qty: 6 CAPSULE | Refills: 0 | Status: SHIPPED | OUTPATIENT
Start: 2024-02-14 | End: 2024-04-02 | Stop reason: ALTCHOICE

## 2024-02-14 RX ORDER — PROPOFOL 10 MG/ML
INJECTION, EMULSION INTRAVENOUS AS NEEDED
Status: DISCONTINUED | OUTPATIENT
Start: 2024-02-14 | End: 2024-02-14

## 2024-02-14 RX ORDER — LABETALOL HYDROCHLORIDE 5 MG/ML
5 INJECTION, SOLUTION INTRAVENOUS ONCE AS NEEDED
Status: DISCONTINUED | OUTPATIENT
Start: 2024-02-14 | End: 2024-02-14 | Stop reason: HOSPADM

## 2024-02-14 RX ORDER — ONDANSETRON HYDROCHLORIDE 2 MG/ML
4 INJECTION, SOLUTION INTRAVENOUS ONCE AS NEEDED
Status: DISCONTINUED | OUTPATIENT
Start: 2024-02-14 | End: 2024-02-14 | Stop reason: HOSPADM

## 2024-02-14 RX ORDER — KETOROLAC TROMETHAMINE 30 MG/ML
INJECTION, SOLUTION INTRAMUSCULAR; INTRAVENOUS AS NEEDED
Status: DISCONTINUED | OUTPATIENT
Start: 2024-02-14 | End: 2024-02-14

## 2024-02-14 RX ORDER — DEXAMETHASONE SODIUM PHOSPHATE 4 MG/ML
INJECTION, SOLUTION INTRA-ARTICULAR; INTRALESIONAL; INTRAMUSCULAR; INTRAVENOUS; SOFT TISSUE AS NEEDED
Status: DISCONTINUED | OUTPATIENT
Start: 2024-02-14 | End: 2024-02-14

## 2024-02-14 RX ORDER — PHENYLEPHRINE HCL IN 0.9% NACL 1 MG/10 ML
SYRINGE (ML) INTRAVENOUS AS NEEDED
Status: DISCONTINUED | OUTPATIENT
Start: 2024-02-14 | End: 2024-02-14

## 2024-02-14 RX ORDER — MIDAZOLAM HYDROCHLORIDE 1 MG/ML
INJECTION, SOLUTION INTRAMUSCULAR; INTRAVENOUS AS NEEDED
Status: DISCONTINUED | OUTPATIENT
Start: 2024-02-14 | End: 2024-02-14

## 2024-02-14 RX ORDER — LIDOCAINE HYDROCHLORIDE 20 MG/ML
INJECTION, SOLUTION INFILTRATION; PERINEURAL AS NEEDED
Status: DISCONTINUED | OUTPATIENT
Start: 2024-02-14 | End: 2024-02-14 | Stop reason: HOSPADM

## 2024-02-14 RX ORDER — ALBUTEROL SULFATE 0.83 MG/ML
2.5 SOLUTION RESPIRATORY (INHALATION) ONCE AS NEEDED
Status: DISCONTINUED | OUTPATIENT
Start: 2024-02-14 | End: 2024-02-14 | Stop reason: HOSPADM

## 2024-02-14 RX ORDER — CELECOXIB 200 MG/1
200 CAPSULE ORAL ONCE
Status: CANCELLED | OUTPATIENT
Start: 2024-02-14 | End: 2024-02-14

## 2024-02-14 RX ORDER — OXYCODONE AND ACETAMINOPHEN 5; 325 MG/1; MG/1
1 TABLET ORAL EVERY 6 HOURS PRN
Qty: 10 TABLET | Refills: 0 | Status: SHIPPED | OUTPATIENT
Start: 2024-02-14 | End: 2024-04-02 | Stop reason: ALTCHOICE

## 2024-02-14 RX ADMIN — PROPOFOL 50 MCG/KG/MIN: 10 INJECTION, EMULSION INTRAVENOUS at 07:55

## 2024-02-14 RX ADMIN — ONDANSETRON 4 MG: 2 INJECTION, SOLUTION INTRAMUSCULAR; INTRAVENOUS at 07:38

## 2024-02-14 RX ADMIN — FENTANYL CITRATE 50 MCG: 50 INJECTION, SOLUTION INTRAMUSCULAR; INTRAVENOUS at 07:44

## 2024-02-14 RX ADMIN — FENTANYL CITRATE 25 MCG: 50 INJECTION, SOLUTION INTRAMUSCULAR; INTRAVENOUS at 08:13

## 2024-02-14 RX ADMIN — FENTANYL CITRATE 25 MCG: 50 INJECTION, SOLUTION INTRAMUSCULAR; INTRAVENOUS at 08:17

## 2024-02-14 RX ADMIN — MIDAZOLAM 2 MG: 1 INJECTION INTRAMUSCULAR; INTRAVENOUS at 07:38

## 2024-02-14 RX ADMIN — SODIUM CHLORIDE, POTASSIUM CHLORIDE, SODIUM LACTATE AND CALCIUM CHLORIDE: 600; 310; 30; 20 INJECTION, SOLUTION INTRAVENOUS at 07:39

## 2024-02-14 RX ADMIN — DEXAMETHASONE SODIUM PHOSPHATE 4 MG: 4 INJECTION, SOLUTION INTRAMUSCULAR; INTRAVENOUS at 07:50

## 2024-02-14 RX ADMIN — Medication 100 MCG: at 07:55

## 2024-02-14 RX ADMIN — LIDOCAINE HYDROCHLORIDE 100 MG: 20 INJECTION, SOLUTION INFILTRATION; PERINEURAL at 07:44

## 2024-02-14 RX ADMIN — CEFAZOLIN SODIUM 2 G: 2 INJECTION, SOLUTION INTRAVENOUS at 07:55

## 2024-02-14 RX ADMIN — KETOROLAC TROMETHAMINE 15 MG: 30 INJECTION, SOLUTION INTRAMUSCULAR at 08:37

## 2024-02-14 RX ADMIN — Medication 100 MCG: at 08:13

## 2024-02-14 RX ADMIN — PROPOFOL 200 MG: 10 INJECTION, EMULSION INTRAVENOUS at 07:44

## 2024-02-14 RX ADMIN — PROPOFOL 30 MG: 10 INJECTION, EMULSION INTRAVENOUS at 08:22

## 2024-02-14 SDOH — HEALTH STABILITY: MENTAL HEALTH: CURRENT SMOKER: 0

## 2024-02-14 ASSESSMENT — COLUMBIA-SUICIDE SEVERITY RATING SCALE - C-SSRS
1. IN THE PAST MONTH, HAVE YOU WISHED YOU WERE DEAD OR WISHED YOU COULD GO TO SLEEP AND NOT WAKE UP?: NO
6. HAVE YOU EVER DONE ANYTHING, STARTED TO DO ANYTHING, OR PREPARED TO DO ANYTHING TO END YOUR LIFE?: NO
2. HAVE YOU ACTUALLY HAD ANY THOUGHTS OF KILLING YOURSELF?: NO

## 2024-02-14 ASSESSMENT — PAIN - FUNCTIONAL ASSESSMENT
PAIN_FUNCTIONAL_ASSESSMENT: 0-10

## 2024-02-14 ASSESSMENT — PAIN SCALES - GENERAL
PAINLEVEL_OUTOF10: 0 - NO PAIN
PAINLEVEL_OUTOF10: 3
PAINLEVEL_OUTOF10: 0 - NO PAIN

## 2024-02-14 NOTE — ANESTHESIA PREPROCEDURE EVALUATION
Patient: Jayesh Metcalf    Procedure Information       Date/Time: 02/14/24 0730    Procedure: LEFT ARTHROSCOPIC KNEE SURGERY; PARTIAL MEDIAL MENISCECTOMY (Left: Knee)    Location: [facility] OR 09 / [facility]    Surgeons: Robert Staples MD          Vitals:    02/14/24 0700   BP: (!) 178/102   Pulse: 59   Resp: 18   Temp: 36.7 °C (98.1 °F)   SpO2: 97%       Past Surgical History:   Procedure Laterality Date    CARDIAC CATHETERIZATION      COLONOSCOPY      CORONARY STENT PLACEMENT      OTHER SURGICAL HISTORY  01/13/2015    Surgery    OTHER SURGICAL HISTORY  11/20/2021    Complete colonoscopy    OTHER SURGICAL HISTORY  11/20/2021    Percutaneous transluminal coronary angioplasty    OTHER SURGICAL HISTORY  11/20/2021    Umbilical hernia repair     Past Medical History:   Diagnosis Date    Atherosclerotic heart disease of native coronary artery without angina pectoris     Coronary artery disease    Heart disease     Ingrowing nail     IGTN (ingrowing toe nail)    Other insomnia     Secondary insomnia    Personal history of other diseases of the circulatory system     History of intermittent claudication    Personal history of other diseases of the circulatory system     History of orthostatic hypotension    Personal history of other diseases of the digestive system     History of gastroesophageal reflux (GERD)    Personal history of other diseases of the digestive system     History of IBS    Personal history of other diseases of the digestive system     History of umbilical hernia    Personal history of other diseases of the musculoskeletal system and connective tissue     History of neck pain    Personal history of other specified conditions     History of diarrhea    Personal history of other specified conditions     History of fatigue    Personal history of other specified conditions     History of nocturia    Rash and other nonspecific skin eruption     Skin rash     No current facility-administered medications for  this encounter.  Prior to Admission medications    Medication Sig Start Date End Date Taking? Authorizing Provider   aspirin 81 mg chewable tablet Chew 1 tablet (81 mg) once daily.   Yes Historical Provider, MD   atorvastatin (Lipitor) 80 mg tablet Take 1 tablet (80 mg) by mouth once daily at bedtime.   Yes Historical Provider, MD   flaxseed-omega3,6,9-fatty acid 1,300-670-155 mg capsule Take 1 capsule by mouth once daily.   Yes Historical Provider, MD   glucosamine-chondroitin 500-400 mg tablet Take 1 tablet by mouth once daily.   Yes Historical Provider, MD   lisinopril 10 mg tablet Take 1 tablet (10 mg) by mouth once daily. 1/15/24 7/13/24 Yes Maria Luisa Bryson MD   metoprolol tartrate (Lopressor) 50 mg tablet Take 1 tablet by mouth once daily. 5/23/22  Yes Historical Provider, MD   multivitamin with minerals iron-free (Centrum Silver) Take 1 tablet by mouth once daily.   Yes Historical Provider, MD   omega 3-dha-epa-fish oil (Fish OiL) 1,200 (144-216) mg capsule Take by mouth.   Yes Historical Provider, MD   sertraline (Zoloft) 50 mg tablet Take 1 tablet (50 mg) by mouth 2 times a day.   Yes Historical Provider, MD   meloxicam (Mobic) 15 mg tablet Take 1 tablet (15 mg) by mouth once daily. 12/21/23   Historical Provider, MD     No Known Allergies  Social History     Tobacco Use    Smoking status: Never    Smokeless tobacco: Never   Substance Use Topics    Alcohol use: Not Currently         Chemistry    Lab Results   Component Value Date/Time     02/07/2024 1029    K 4.5 02/07/2024 1029     02/07/2024 1029    CO2 31 02/07/2024 1029    BUN 14 02/07/2024 1029    CREATININE 0.77 02/07/2024 1029    Lab Results   Component Value Date/Time    CALCIUM 9.5 02/07/2024 1029    ALKPHOS 90 11/30/2022 0959    AST 28 11/30/2022 0959    ALT 29 11/30/2022 0959    BILITOT 0.8 11/30/2022 0959          Lab Results   Component Value Date/Time    WBC 7.2 11/30/2022 0959    HGB 15.2 11/30/2022 0959    HCT 45.8 11/30/2022 0959  "    11/30/2022 0959     No results found for: \"PROTIME\", \"PTT\", \"INR\"  No results found for this or any previous visit (from the past 4464 hour(s)).     Relevant Problems   Cardiovascular   (+) Benign essential hypertension   (+) CAD (coronary artery disease)   (+) ECG abnormal   (+) Hyperlipidemia, mixed   (+) Stented coronary artery      Endocrine   (+) Obese      Neuro/Psych   (+) Acute anxiety   (+) Generalized anxiety disorder   (+) Obsessive compulsive disorder   (+) Phobia       Clinical information reviewed:   Tobacco  Allergies  Meds   Med Hx  Surg Hx   Fam Hx  Soc Hx        NPO Detail:  NPO/Void Status  Carbohydrate Drink Given Prior to Surgery? : N  Date of Last Liquid: 02/14/24  Time of Last Liquid: 0400  Date of Last Solid: 02/13/24  Time of Last Solid: 2100  Last Intake Type: Clear fluids  Time of Last Void: 0701         PHYSICAL EXAM    Anesthesia Plan    History of general anesthesia?: yes  History of complications of general anesthesia?: no    ASA 3     general     The patient is not a current smoker.  Patient was previously instructed to abstain from smoking on day of procedure.  Patient did not smoke on day of procedure.  Education provided regarding risk of obstructive sleep apnea.  intravenous induction   Postoperative administration of opioids is intended.  Anesthetic plan and risks discussed with patient.  Use of blood products discussed with patient who.    Plan discussed with CRNA.      "

## 2024-02-14 NOTE — OP NOTE
LEFT ARTHROSCOPIC KNEE SURGERY; PARTIAL MEDIAL MENISCECTOMY (L) Operative Note     Date: 2024  OR Location: [facility] OR    Name: Jayesh Metcalf : 1955, Age: 68 y.o., MRN: [id number], Sex: male    Diagnosis  * No Diagnosis Codes entered * * No Diagnosis Codes entered *     Procedures  LEFT ARTHROSCOPIC KNEE SURGERY; PARTIAL MEDIAL MENISCECTOMY  59710 - CT ARTHRS KNE SURG W/MENISCECTOMY MED/LAT W/SHVG      Surgeons      * Robert Staples - Primary    Resident/Fellow/Other Assistant:  Surgeon(s) and Role:    Procedure Summary  Anesthesia: General  ASA: III  Anesthesia Staff: Anesthesiologist: Александр López DO  CRNA: ANTHONY Grullon-CRNA  Estimated Blood Loss:  minimal  Intra-op Medications:   Administrations occurring from 0730 to 0900 on 24:   Medication Name Total Dose   lidocaine (Xylocaine) 20 mg/mL (2 %) injection 8 mL   lactated Ringer's infusion Cannot be calculated              Anesthesia Record               Intraprocedure I/O Totals          Intake    Propofol Drip 0.00 mL    The total shown is the total volume documented since Anesthesia Start was filed.    ceFAZolin in dextrose (iso-os) 2 gram/100 mL 100.00 mL    Total Intake 100 mL          Specimen: No specimens collected     Staff:   Circulator: Gretchen Suggs RN  Scrub Person: Donnie Page         Drains and/or Catheters: * None in log *    Tourniquet Times:   * Missing tourniquet times found for documented tourniquets in lo *     Implants:     Findings: Left knee posterior horn complex medial meniscus tear    Indications: Jayesh Metcalf is an 68 y.o. male who is having surgery for S83.232A - TORN MEDIAL MENISCUS, LEFT KNEE.     The patient was seen in the preoperative area. The risks, benefits, complications, treatment options, non-operative alternatives, expected recovery and outcomes were discussed with the patient. The possibilities of reaction to medication, pulmonary aspiration, injury to  surrounding structures, bleeding, recurrent infection, the need for additional procedures, failure to diagnose a condition, and creating a complication requiring transfusion or operation were discussed with the patient. The patient concurred with the proposed plan, giving informed consent.  The site of surgery was properly noted/marked if necessary per policy. The patient has been actively warmed in preoperative area. Preoperative antibiotics have been ordered and given within 1 hours of incision. Venous thrombosis prophylaxis are not indicated.    Procedure Details: Left arthroscopic knee surgery and partial medial meniscectomy.      Complications:  None; patient tolerated the procedure well.    Disposition: PACU - hemodynamically stable.  Condition: stable     This is a 68-year of age male who has had persistent left knee pain over the last 2 to 3 months.  He had mechanical symptoms.  Clinical exam and MRI were consistent with a medial meniscus tear.  Risks and benefits of nonoperative and operative treatment reviewed.  Patient wanted to proceed with an operative approach.  Left arthroscopic knee surgery partial  meniscectomy and possible meniscus repair were reviewed.  They were informed of the incisions and period of recovery.  Patient was informed of risks to include but not limited to persistent pain, recurrent tear, infection, wound complications, arthrofibrosis, DVT, pulmonary embolus, neurovascular injury, complex regional pain syndrome, medical and anesthetic risk.  In addition they were informed of the potential risk of exposure to COVID-19 and the potential medical complications.  All questions were answered and consent was obtained.    Patient was brought to the operating room where after induction of general anesthesia while supine on the operating table exam under anesthesia was undertaken.  Patient had moderate evidence of effusion.  There was normal tracking the patella.  Range of motion was 0-135  degrees.  Patient had negative anterior drawer, negative Lachmans.  Patient had no evidence of medial, lateral, posterior or posterior lateral instability.  Left lower extremity was prepped and draped in usual sterile fashion.  Patient was given 2 g of cefazolin for prophylactic antibiotics.  Timeout was taken confirming surgical site, procedure, instrumentation, fire risk, and antibiotics.  Sterile Esmarch was used to exsanguinate the left lower extremity.  Tourniquet was inflated to 300 mmHg.  60 cc of sterile saline introduced into the knee joint.15 blade was used to make standard anterior medial and anterior lateral portals.  The arthroscope was inserted through the anterolateral portal into the suprapatellar pouch.  Under direct vision a medial egress needle was placed for outflow.  Arthroscopic evaluation demonstrated no evidence of loose bodies or other pathology within the pouch of the gutters.  The patella was normally centered.  There is grade 3 chondral changes in the trochlea.  The notch demonstrated intact anterior cruciate ligament and posterior cruciate ligament.  There grade 2 chondral changes in the medial femoral condyle and medial tibial plateau demonstrated grade 3 chondral changes.  The medial compartment demonstrated evidence of complex medial meniscus tear.   There was a complex tear of the posterior horn of the medial meniscus.  This involved approximately 30 to 40% of the posterior horn of the  meniscus.  Using arthroscopic biter and shaver partial medial meniscectomy was completed.  A stable meniscal rim was obtained.  The lateral compartment demonstrated no evidence of tear of the lateral meniscus.  There is no evidence of degenerative changes within the lateral compartment.    Final arthroscopic images were taken.  The knee was copiously irrigated through inflow outflow cannulas.  Arthroscopic instruments were removed.  Portals closed with 4-0 Monocryl subcuticular suture.  Bacitracin  Adaptic sterile dressings were applied.  All sponge and the counts were correct at the end the case.          Attending Attestation:     Robert Staples  Phone Number: [phone]

## 2024-02-14 NOTE — ANESTHESIA PROCEDURE NOTES
Airway  Date/Time: 2/14/2024 7:47 AM  Urgency: elective      Staffing  Performed: CRNA   Authorized by: Александр López DO    Performed by: ANTHONY Grullon-REFUGIO  Patient location during procedure: OR    Indications and Patient Condition  Indications for airway management: anesthesia  Spontaneous Ventilation: absent  Sedation level: deep  Preoxygenated: yes  Patient position: sniffing  Mask difficulty assessment: 0 - not attempted    Final Airway Details  Final airway type: supraglottic airway      Successful airway: Supraglottic airway: IGEL.  Size 4     Number of attempts at approach: 1

## 2024-02-14 NOTE — DISCHARGE INSTRUCTIONS
Post operative instructions:  Knee arthroscopy     Use your crutches while you have a limp.  This usually lasts anywhere from 1 day to 1 week.    When you get rid of your limp you can get rid of your crutches.  2.  You may put as much weight on your leg as the knee will tolerate.    3.  Apply ice for swelling and discomfort; 20 minutes on and 20 minutes off.  4.  You may range the knee from extension to flexion without limitation.  5.  Your follow-up appointment should be at 2 weeks.  6.  Remove the bulky dressing after 72 hours.  Use Band-Aids or a small gauze pad to protect the incision sites.  The Ace wrap is used as needed to help control swelling.  Always wrap from the calf toward the thigh.  7.  Keep the incisions clean and dry for 3 days.  Once the dressing is removed you may take a shower, but do not submerge the leg in a bathtub, whirlpool, swimming pool etc.  8.  Please contact the surgeons office for any questions or concerns at [phone].

## 2024-02-14 NOTE — ANESTHESIA POSTPROCEDURE EVALUATION
Patient: Jayesh Metcalf    Procedure Summary       Date: 02/14/24 Room / Location: [facility] OR 09 / [facility]    Anesthesia Start: 0741 Anesthesia Stop: 0849    Procedure: LEFT ARTHROSCOPIC KNEE SURGERY; PARTIAL MEDIAL MENISCECTOMY (Left: Knee) Diagnosis: (S83.232A - TORN MEDIAL MENISCUS, LEFT KNEE)    Surgeons: Robert Staples MD Responsible Provider: Александр López DO    Anesthesia Type: general ASA Status: 3            Anesthesia Type: general    Vitals Value Taken Time   /82 02/14/24 0845   Temp 36.2 02/14/24 0849   Pulse 60 02/14/24 0846   Resp 13 02/14/24 0846   SpO2 99 % 02/14/24 0846   Vitals shown include unvalidated device data.    Anesthesia Post Evaluation    Patient location during evaluation: PACU  Patient participation: complete - patient cannot participate  Level of consciousness: sedated  Pain management: adequate  Airway patency: patent (OPA in place)  Cardiovascular status: acceptable  Respiratory status: acceptable, nonlabored ventilation, oral airway, face mask and spontaneous ventilation  Hydration status: acceptable  Postoperative Nausea and Vomiting: none  Comments: Handoff to PACU RN        No notable events documented.

## 2024-03-04 DIAGNOSIS — F40.9 PHOBIA, UNSPECIFIED TYPE: Primary | ICD-10-CM

## 2024-03-04 DIAGNOSIS — E78.2 HYPERLIPIDEMIA, MIXED: Primary | ICD-10-CM

## 2024-03-05 RX ORDER — ATORVASTATIN CALCIUM 80 MG/1
80 TABLET, FILM COATED ORAL NIGHTLY
Qty: 90 TABLET | Refills: 3 | Status: SHIPPED | OUTPATIENT
Start: 2024-03-05

## 2024-03-06 RX ORDER — SERTRALINE HYDROCHLORIDE 50 MG/1
50 TABLET, FILM COATED ORAL 2 TIMES DAILY
Qty: 60 TABLET | Refills: 1 | Status: SHIPPED | OUTPATIENT
Start: 2024-03-06 | End: 2024-04-02 | Stop reason: SDUPTHER

## 2024-04-02 ENCOUNTER — OFFICE VISIT (OUTPATIENT)
Dept: PRIMARY CARE | Facility: CLINIC | Age: 69
End: 2024-04-02
Payer: MEDICARE

## 2024-04-02 VITALS
WEIGHT: 249.6 LBS | DIASTOLIC BLOOD PRESSURE: 80 MMHG | BODY MASS INDEX: 36.97 KG/M2 | HEIGHT: 69 IN | HEART RATE: 54 BPM | SYSTOLIC BLOOD PRESSURE: 137 MMHG

## 2024-04-02 DIAGNOSIS — E78.2 HYPERLIPIDEMIA, MIXED: Primary | ICD-10-CM

## 2024-04-02 DIAGNOSIS — G89.29 CHRONIC PAIN OF LEFT KNEE: ICD-10-CM

## 2024-04-02 DIAGNOSIS — F40.9 PHOBIA, UNSPECIFIED TYPE: ICD-10-CM

## 2024-04-02 DIAGNOSIS — I10 BENIGN ESSENTIAL HYPERTENSION: ICD-10-CM

## 2024-04-02 DIAGNOSIS — E88.810 DYSMETABOLIC SYNDROME X: ICD-10-CM

## 2024-04-02 DIAGNOSIS — I25.10 CORONARY ARTERY DISEASE INVOLVING NATIVE CORONARY ARTERY OF NATIVE HEART WITHOUT ANGINA PECTORIS: ICD-10-CM

## 2024-04-02 DIAGNOSIS — G47.33 OBSTRUCTIVE SLEEP APNEA SYNDROME: ICD-10-CM

## 2024-04-02 DIAGNOSIS — Z13.29 SCREENING FOR THYROID DISORDER: ICD-10-CM

## 2024-04-02 DIAGNOSIS — M25.562 CHRONIC PAIN OF LEFT KNEE: ICD-10-CM

## 2024-04-02 DIAGNOSIS — Z12.5 PROSTATE CANCER SCREENING: ICD-10-CM

## 2024-04-02 DIAGNOSIS — Z13.21 ENCOUNTER FOR VITAMIN DEFICIENCY SCREENING: ICD-10-CM

## 2024-04-02 PROBLEM — E66.01 OBESITY, MORBID (MULTI): Status: ACTIVE | Noted: 2024-01-08

## 2024-04-02 PROBLEM — E66.01 OBESITY, MORBID (MULTI): Status: RESOLVED | Noted: 2024-01-08 | Resolved: 2024-04-02

## 2024-04-02 PROCEDURE — 99214 OFFICE O/P EST MOD 30 MIN: CPT | Performed by: INTERNAL MEDICINE

## 2024-04-02 PROCEDURE — 1159F MED LIST DOCD IN RCRD: CPT | Performed by: INTERNAL MEDICINE

## 2024-04-02 PROCEDURE — 3008F BODY MASS INDEX DOCD: CPT | Performed by: INTERNAL MEDICINE

## 2024-04-02 PROCEDURE — 3079F DIAST BP 80-89 MM HG: CPT | Performed by: INTERNAL MEDICINE

## 2024-04-02 PROCEDURE — 1036F TOBACCO NON-USER: CPT | Performed by: INTERNAL MEDICINE

## 2024-04-02 PROCEDURE — 1160F RVW MEDS BY RX/DR IN RCRD: CPT | Performed by: INTERNAL MEDICINE

## 2024-04-02 PROCEDURE — 3075F SYST BP GE 130 - 139MM HG: CPT | Performed by: INTERNAL MEDICINE

## 2024-04-02 RX ORDER — SERTRALINE HYDROCHLORIDE 50 MG/1
50 TABLET, FILM COATED ORAL 2 TIMES DAILY
Qty: 180 TABLET | Refills: 3 | Status: SHIPPED | OUTPATIENT
Start: 2024-04-02 | End: 2025-04-02

## 2024-04-02 ASSESSMENT — LIFESTYLE VARIABLES
HAVE YOU OR SOMEONE ELSE BEEN INJURED AS A RESULT OF YOUR DRINKING: NO
AUDIT TOTAL SCORE: 0
HOW OFTEN DO YOU HAVE SIX OR MORE DRINKS ON ONE OCCASION: NEVER
SKIP TO QUESTIONS 9-10: 1
HOW OFTEN DO YOU HAVE A DRINK CONTAINING ALCOHOL: NEVER
AUDIT-C TOTAL SCORE: 0
HAS A RELATIVE, FRIEND, DOCTOR, OR ANOTHER HEALTH PROFESSIONAL EXPRESSED CONCERN ABOUT YOUR DRINKING OR SUGGESTED YOU CUT DOWN: NO
HOW MANY STANDARD DRINKS CONTAINING ALCOHOL DO YOU HAVE ON A TYPICAL DAY: PATIENT DOES NOT DRINK

## 2024-04-02 ASSESSMENT — PATIENT HEALTH QUESTIONNAIRE - PHQ9
1. LITTLE INTEREST OR PLEASURE IN DOING THINGS: NOT AT ALL
SUM OF ALL RESPONSES TO PHQ9 QUESTIONS 1 AND 2: 0
2. FEELING DOWN, DEPRESSED OR HOPELESS: NOT AT ALL

## 2024-04-02 NOTE — PROGRESS NOTES
Assessment/Plan   68 years old male with a history of coronary artery disease has no symptoms of angina and he follows up with the cardiologist regularly.    His blood pressure is well-controlled.    Patient has hyperlipidemia and his last blood test shows elevated triglycerides.  He understands importance of having optimal cholesterol control.  He is on atorvastatin 80 mg and tolerating it well.    Patient has history of depression and also phobia which is stable.  He will continue the sertraline 50 mg twice a day and a prescription was sent to the pharmacy for a year as per his request.    Preventive care were reviewed and will have the blood test as ordered and approved by his insurance.  Could not do the TSH since it is not covered by the insurance.  We discussed about his twice urination at nights and it is not significantly abnormal and his last PSA was acceptable about 11 months ago.  He will have a repeat PSA and if there is any change in his urination he understands to call me.    As per the patient's request he will follow-up with me in 1 years time.  He says his cardiologist takes care of him regularly and he is comfortable with it.      Problem List Items Addressed This Visit       Benign essential hypertension    Relevant Orders    CBC and Auto Differential    Comprehensive Metabolic Panel    Dysmetabolic syndrome X    Hyperlipidemia, mixed - Primary    Relevant Orders    CBC and Auto Differential    Comprehensive Metabolic Panel    Phobia    Relevant Medications    sertraline (Zoloft) 50 mg tablet    Sleep apnea    CAD (coronary artery disease)    Chronic pain of left knee     Other Visit Diagnoses       Prostate cancer screening        Relevant Orders    Prostate Specific Antigen, Screen    Screening for thyroid disorder        Encounter for vitamin deficiency screening        Relevant Orders    Vitamin D 25-Hydroxy,Total (for eval of Vitamin D levels)    Body mass index (BMI) 32.0-32.9, adult         Relevant Orders    Vitamin D 25-Hydroxy,Total (for eval of Vitamin D levels)            Subjective     Patient ID: Jayesh Metcalf is a 68 y.o. male who presents for Med Refill.    History of present illness  68 years old male with history of coronary artery disease follows with the cardiologist regularly came for a regular follow-up.  He has been on sertraline for several years and he says he is doing well.  His phobia is stable.  He denies any suicidal homicidal ideation.  He says he has dreams almost every day but not nightmare.    He is having problems with his knees.  He has had left knee procedures done for meniscus and he is seeing the orthopedic today because he has continued discomfort in the left knee.  No swelling.  No history of trauma.  He also has mild discomfort in the right knee.    He is complaining of right leg pain mostly from the lower back to the right knee.  He thinks it may be radiating pain but mostly feels like muscle spasms.    Because of his knee pain he has not been exercising as usual he does.  But he does ride the bicycle and also goes to gym.  He admits that he has gained weight.    He is taking his medications as prescribed by the cardiologist for his blood pressure and cardiac condition.    He has urination at nights twice.  But he does not have any burning urination and no frequency during the daytime.    He is up-to-date with the colonoscopy.    He denies any significant snoring and he says he does not want to do the sleep study again.  ROS  Rest of the review of systems no acute complaints    Family History   Problem Relation Name Age of Onset    Kidney failure Mother      Other (CABG) Father        Social History     Socioeconomic History    Marital status: Single     Spouse name: Not on file    Number of children: Not on file    Years of education: Not on file    Highest education level: Not on file   Occupational History    Not on file   Tobacco Use    Smoking status: Former      Types: Cigarettes     Quit date:      Years since quittin.2    Smokeless tobacco: Never   Vaping Use    Vaping Use: Never used   Substance and Sexual Activity    Alcohol use: Never    Drug use: Never    Sexual activity: Yes   Other Topics Concern    Not on file   Social History Narrative    Not on file     Social Determinants of Health     Financial Resource Strain: Not on file   Food Insecurity: Not on file   Transportation Needs: Not on file   Physical Activity: Not on file   Stress: Not on file   Social Connections: Not on file   Intimate Partner Violence: Not on file   Housing Stability: Not on file      Patient has no known allergies.   Current Outpatient Medications   Medication Sig Dispense Refill    aspirin 81 mg chewable tablet Chew 1 tablet (81 mg) once daily.      atorvastatin (Lipitor) 80 mg tablet TAKE 1 TABLET BY MOUTH EVERYDAY AT BEDTIME 90 tablet 3    glucosamine-chondroitin 500-400 mg tablet Take 1 tablet by mouth once daily.      lisinopril 10 mg tablet Take 1 tablet (10 mg) by mouth once daily. 90 tablet 1    meloxicam (Mobic) 15 mg tablet Take 1 tablet (15 mg) by mouth once daily.      metoprolol tartrate (Lopressor) 50 mg tablet Take 1 tablet by mouth once daily.      multivitamin with minerals iron-free (Centrum Silver) Take 1 tablet by mouth once daily.      omega 3-dha-epa-fish oil (Fish OiL) 1,200 (144-216) mg capsule Take by mouth.      sertraline (Zoloft) 50 mg tablet Take 1 tablet (50 mg) by mouth 2 times a day. 180 tablet 3     No current facility-administered medications for this visit.       Objective     Vitals:    24 0849   BP: 137/80   Pulse: 54        Physical Exam    Moderate obese, well-nourished  with no apparent distress. Alert oriented  Skin:  Normal turgor.  No rash.  Head:  Normocephalic, atraumatic.  Eyes:  Pupils are equal, round,.  No pallor of conjunctivae.  Mouth has moist oral mucosa.  Neck:  Supple.  No JVD.  No carotid bruit.  No thyromegaly. No  cervical lymphadenopathy.  No clubbing  Chest:  Vesicular breathing Bilaterally good air entry and bilaterally clear to auscultation.  No wheezing.  No crackles.  Heart:  Regular rate and rhythm.  S1, S2 positive.  No murmur.  Abdomen:  Soft and nontender.  Bowel sounds are positive.  No organomegaly.  Extremities:  Left knee has no swelling and a detailed exam deferred since he is seeing the orthopedic today.  Bilaterally no pedal pitting edema.  Bilaterally 2+ dorsalis pedis pulses.  No calf tenderness. Homans sign is negative.  Neuro Exam: No focal signs. Gait is normal.    Problem List Items Addressed This Visit       Benign essential hypertension    Relevant Orders    CBC and Auto Differential    Comprehensive Metabolic Panel    Dysmetabolic syndrome X    Hyperlipidemia, mixed - Primary    Relevant Orders    CBC and Auto Differential    Comprehensive Metabolic Panel    Phobia    Relevant Medications    sertraline (Zoloft) 50 mg tablet    Sleep apnea    CAD (coronary artery disease)    Chronic pain of left knee     Other Visit Diagnoses       Prostate cancer screening        Relevant Orders    Prostate Specific Antigen, Screen    Screening for thyroid disorder        Encounter for vitamin deficiency screening        Relevant Orders    Vitamin D 25-Hydroxy,Total (for eval of Vitamin D levels)    Body mass index (BMI) 32.0-32.9, adult        Relevant Orders    Vitamin D 25-Hydroxy,Total (for eval of Vitamin D levels)             Orders Placed This Encounter   Procedures    Prostate Specific Antigen, Screen     Standing Status:   Future     Standing Expiration Date:   4/2/2025     Order Specific Question:   Release result to [facility]     Answer:   Immediate [1]    CBC and Auto Differential     Standing Status:   Future     Standing Expiration Date:   4/2/2025     Order Specific Question:   Release result to [facility]     Answer:   Immediate    Comprehensive Metabolic Panel     Standing Status:   Future     Standing  Expiration Date:   4/2/2025     Order Specific Question:   Release result to [facility]     Answer:   Immediate    Vitamin D 25-Hydroxy,Total (for eval of Vitamin D levels)     Standing Status:   Future     Standing Expiration Date:   4/2/2025     Order Specific Question:   [facility]     Answer:   Immediate        Lab Results   Component Value Date    WBC 7.2 11/30/2022    HGB 15.2 11/30/2022    HCT 45.8 11/30/2022     11/30/2022    CHOL 170 11/27/2023    TRIG 235 (H) 11/27/2023    HDL 36.5 11/27/2023    ALT 29 11/30/2022    AST 28 11/30/2022     02/07/2024    K 4.5 02/07/2024     02/07/2024    CREATININE 0.77 02/07/2024    BUN 14 02/07/2024    CO2 31 02/07/2024    TSH 0.95 11/30/2022    PSA 0.23 05/04/2023    HGBA1C 5.7 (H) 11/27/2023     Lab Results   Component Value Date    CHOL 170 11/27/2023    LDLCALC 87 11/27/2023    CHHDL 4.7 11/27/2023       No results found.

## 2024-04-15 ENCOUNTER — PATIENT OUTREACH (OUTPATIENT)
Dept: CARE COORDINATION | Facility: CLINIC | Age: 69
End: 2024-04-15
Payer: MEDICARE

## 2024-04-15 NOTE — PROGRESS NOTES
Date: 04/15/24    Dear Jayesh Metcalf    Our records indicate that you are due for the following appointment or test: Annual Wellness Visit    Please call our office at your earliest convenience. We can assist you with scheduling an appointment or test.  If you have already scheduled an appointment or have completed testing, please disregard this letter.    Sincerely,    Ximena Santana    [place] [address]    Office Phone: [phone]  Patient Navigator: [phone]

## 2024-04-23 ENCOUNTER — APPOINTMENT (OUTPATIENT)
Dept: PRIMARY CARE | Facility: CLINIC | Age: 69
End: 2024-04-23
Payer: MEDICARE

## 2024-06-18 ENCOUNTER — TELEPHONE (OUTPATIENT)
Dept: PRIMARY CARE | Facility: CLINIC | Age: 69
End: 2024-06-18
Payer: MEDICARE

## 2024-06-28 ENCOUNTER — LAB (OUTPATIENT)
Dept: LAB | Facility: LAB | Age: 69
End: 2024-06-28
Payer: MEDICARE

## 2024-06-28 DIAGNOSIS — E78.2 HYPERLIPIDEMIA, MIXED: ICD-10-CM

## 2024-06-28 DIAGNOSIS — Z12.5 PROSTATE CANCER SCREENING: ICD-10-CM

## 2024-06-28 DIAGNOSIS — I10 BENIGN ESSENTIAL HYPERTENSION: ICD-10-CM

## 2024-06-28 DIAGNOSIS — Z13.21 ENCOUNTER FOR VITAMIN DEFICIENCY SCREENING: ICD-10-CM

## 2024-06-28 DIAGNOSIS — Z12.5 SCREENING FOR PROSTATE CANCER: ICD-10-CM

## 2024-06-28 DIAGNOSIS — E08.44: ICD-10-CM

## 2024-06-28 LAB
25(OH)D3 SERPL-MCNC: 33 NG/ML (ref 30–100)
ALBUMIN SERPL BCP-MCNC: 4.3 G/DL (ref 3.4–5)
ALP SERPL-CCNC: 83 U/L (ref 33–136)
ALT SERPL W P-5'-P-CCNC: 20 U/L (ref 10–52)
ANION GAP SERPL CALC-SCNC: 12 MMOL/L (ref 10–20)
AST SERPL W P-5'-P-CCNC: 23 U/L (ref 9–39)
BASOPHILS # BLD AUTO: 0.1 X10*3/UL (ref 0–0.1)
BASOPHILS NFR BLD AUTO: 1.6 %
BILIRUB SERPL-MCNC: 0.5 MG/DL (ref 0–1.2)
BUN SERPL-MCNC: 20 MG/DL (ref 6–23)
CALCIUM SERPL-MCNC: 9 MG/DL (ref 8.6–10.3)
CHLORIDE SERPL-SCNC: 106 MMOL/L (ref 98–107)
CHOLEST SERPL-MCNC: 159 MG/DL (ref 0–199)
CHOLESTEROL/HDL RATIO: 3.7
CO2 SERPL-SCNC: 25 MMOL/L (ref 21–32)
CREAT SERPL-MCNC: 0.85 MG/DL (ref 0.5–1.3)
EGFRCR SERPLBLD CKD-EPI 2021: >90 ML/MIN/1.73M*2
EOSINOPHIL # BLD AUTO: 0.28 X10*3/UL (ref 0–0.7)
EOSINOPHIL NFR BLD AUTO: 4.4 %
ERYTHROCYTE [DISTWIDTH] IN BLOOD BY AUTOMATED COUNT: 12.4 % (ref 11.5–14.5)
EST. AVERAGE GLUCOSE BLD GHB EST-MCNC: 126 MG/DL
GLUCOSE SERPL-MCNC: 106 MG/DL (ref 74–99)
HBA1C MFR BLD: 6 %
HCT VFR BLD AUTO: 44.1 % (ref 41–52)
HDLC SERPL-MCNC: 43.2 MG/DL
HGB BLD-MCNC: 14.9 G/DL (ref 13.5–17.5)
IMM GRANULOCYTES # BLD AUTO: 0.02 X10*3/UL (ref 0–0.7)
IMM GRANULOCYTES NFR BLD AUTO: 0.3 % (ref 0–0.9)
LDLC SERPL CALC-MCNC: 95 MG/DL
LYMPHOCYTES # BLD AUTO: 1.92 X10*3/UL (ref 1.2–4.8)
LYMPHOCYTES NFR BLD AUTO: 30 %
MCH RBC QN AUTO: 30.8 PG (ref 26–34)
MCHC RBC AUTO-ENTMCNC: 33.8 G/DL (ref 32–36)
MCV RBC AUTO: 91 FL (ref 80–100)
MONOCYTES # BLD AUTO: 0.61 X10*3/UL (ref 0.1–1)
MONOCYTES NFR BLD AUTO: 9.5 %
NEUTROPHILS # BLD AUTO: 3.48 X10*3/UL (ref 1.2–7.7)
NEUTROPHILS NFR BLD AUTO: 54.2 %
NON HDL CHOLESTEROL: 116 MG/DL (ref 0–149)
NRBC BLD-RTO: 0 /100 WBCS (ref 0–0)
PHOSPHATE SERPL-MCNC: 3.3 MG/DL (ref 2.5–4.9)
PLATELET # BLD AUTO: 252 X10*3/UL (ref 150–450)
POTASSIUM SERPL-SCNC: 4.2 MMOL/L (ref 3.5–5.3)
PROT SERPL-MCNC: 7 G/DL (ref 6.4–8.2)
PSA SERPL-MCNC: 0.35 NG/ML
PSA SERPL-MCNC: 0.35 NG/ML
RBC # BLD AUTO: 4.83 X10*6/UL (ref 4.5–5.9)
SODIUM SERPL-SCNC: 139 MMOL/L (ref 136–145)
TRIGL SERPL-MCNC: 105 MG/DL (ref 0–149)
VLDL: 21 MG/DL (ref 0–40)
WBC # BLD AUTO: 6.4 X10*3/UL (ref 4.4–11.3)

## 2024-06-28 PROCEDURE — 84100 ASSAY OF PHOSPHORUS: CPT

## 2024-06-28 PROCEDURE — G0103 PSA SCREENING: HCPCS

## 2024-06-28 PROCEDURE — 36415 COLL VENOUS BLD VENIPUNCTURE: CPT

## 2024-06-28 PROCEDURE — 85025 COMPLETE CBC W/AUTO DIFF WBC: CPT

## 2024-06-28 PROCEDURE — 83036 HEMOGLOBIN GLYCOSYLATED A1C: CPT

## 2024-06-28 PROCEDURE — 80061 LIPID PANEL: CPT

## 2024-06-28 PROCEDURE — 80053 COMPREHEN METABOLIC PANEL: CPT

## 2024-06-28 PROCEDURE — 82306 VITAMIN D 25 HYDROXY: CPT

## 2024-06-29 NOTE — RESULT ENCOUNTER NOTE
Labs acceptable but there are a few abnormalities similar to previous labs.  Please advise patient to continue the same medications and follow-up to review in detail and discuss the management options during the office appointment.  Please make sure the patient understands and he will follow-up within next at least 2 to 3 months.

## 2024-07-01 ENCOUNTER — TELEPHONE (OUTPATIENT)
Dept: PRIMARY CARE | Facility: CLINIC | Age: 69
End: 2024-07-01
Payer: MEDICARE

## 2024-07-01 NOTE — TELEPHONE ENCOUNTER
----- Message from Jefferson Ortez MD sent at 6/29/2024 12:12 PM EDT -----  Labs acceptable but there are a few abnormalities similar to previous labs.  Please advise patient to continue the same medications and follow-up to review in detail and discuss the management options during the office appointment.  Please make sure the patient understands and he will follow-up within next at least 2 to 3 months.

## 2024-07-06 DIAGNOSIS — I10 ESSENTIAL (PRIMARY) HYPERTENSION: Primary | ICD-10-CM

## 2024-07-06 DIAGNOSIS — I10 BENIGN ESSENTIAL HYPERTENSION: ICD-10-CM

## 2024-07-08 DIAGNOSIS — I10 BENIGN ESSENTIAL HYPERTENSION: ICD-10-CM

## 2024-07-08 RX ORDER — METOPROLOL TARTRATE 50 MG/1
50 TABLET ORAL DAILY
Qty: 90 TABLET | Refills: 3 | Status: SHIPPED | OUTPATIENT
Start: 2024-07-08

## 2024-07-08 RX ORDER — LISINOPRIL 5 MG/1
5 TABLET ORAL DAILY
Qty: 90 TABLET | Refills: 3 | Status: SHIPPED | OUTPATIENT
Start: 2024-07-08

## 2024-07-12 RX ORDER — LISINOPRIL 10 MG/1
10 TABLET ORAL DAILY
Qty: 90 TABLET | Refills: 3 | Status: SHIPPED | OUTPATIENT
Start: 2024-07-12

## 2024-07-15 ENCOUNTER — APPOINTMENT (OUTPATIENT)
Dept: CARDIOLOGY | Facility: CLINIC | Age: 69
End: 2024-07-15
Payer: MEDICARE

## 2024-07-15 VITALS
BODY MASS INDEX: 36.23 KG/M2 | HEART RATE: 69 BPM | TEMPERATURE: 97.3 F | HEIGHT: 69 IN | SYSTOLIC BLOOD PRESSURE: 108 MMHG | DIASTOLIC BLOOD PRESSURE: 70 MMHG | WEIGHT: 244.6 LBS

## 2024-07-15 DIAGNOSIS — I25.10 CORONARY ARTERY DISEASE INVOLVING NATIVE CORONARY ARTERY OF NATIVE HEART WITHOUT ANGINA PECTORIS: ICD-10-CM

## 2024-07-15 DIAGNOSIS — R73.9 BLOOD GLUCOSE ELEVATED: ICD-10-CM

## 2024-07-15 DIAGNOSIS — Z87.891 FORMER SMOKER: ICD-10-CM

## 2024-07-15 DIAGNOSIS — E78.2 HYPERLIPIDEMIA, MIXED: ICD-10-CM

## 2024-07-15 DIAGNOSIS — I10 BENIGN ESSENTIAL HYPERTENSION: ICD-10-CM

## 2024-07-15 PROCEDURE — 1159F MED LIST DOCD IN RCRD: CPT | Performed by: INTERNAL MEDICINE

## 2024-07-15 PROCEDURE — 3078F DIAST BP <80 MM HG: CPT | Performed by: INTERNAL MEDICINE

## 2024-07-15 PROCEDURE — 3008F BODY MASS INDEX DOCD: CPT | Performed by: INTERNAL MEDICINE

## 2024-07-15 PROCEDURE — 1036F TOBACCO NON-USER: CPT | Performed by: INTERNAL MEDICINE

## 2024-07-15 PROCEDURE — 3074F SYST BP LT 130 MM HG: CPT | Performed by: INTERNAL MEDICINE

## 2024-07-15 PROCEDURE — 99213 OFFICE O/P EST LOW 20 MIN: CPT | Performed by: INTERNAL MEDICINE

## 2024-07-15 RX ORDER — PHENYLPROPANOLAMINE/CLEMASTINE 75-1.34MG
TABLET, EXTENDED RELEASE ORAL
COMMUNITY

## 2024-07-15 ASSESSMENT — ENCOUNTER SYMPTOMS
RESPIRATORY NEGATIVE: 1
CARDIOVASCULAR NEGATIVE: 1
CONSTITUTIONAL NEGATIVE: 1
NEUROLOGICAL NEGATIVE: 1

## 2024-07-15 NOTE — PROGRESS NOTES
Referred by Dr. Hahn ref. provider found provider found for   Chief Complaint   Patient presents with    Follow-up        History of Present Illness  Jayesh Metcalf is a 69 y.o. year old male patient is here for follow-up.  Doing well from cardiac standpoint no complaint no symptoms of chest pain or shortness of breath.  Denies any symptoms of palpitations syncope or presyncope.  I discussed with the patient Kneifl continue medication will obtain lab work will follow-up as scheduled    Past Medical History  Past Medical History:   Diagnosis Date    Atherosclerotic heart disease of native coronary artery without angina pectoris     Coronary artery disease    Heart disease     Ingrowing nail     IGTN (ingrowing toe nail)    Other insomnia     Secondary insomnia    Personal history of other diseases of the circulatory system     History of intermittent claudication    Personal history of other diseases of the circulatory system     History of orthostatic hypotension    Personal history of other diseases of the digestive system     History of gastroesophageal reflux (GERD)    Personal history of other diseases of the digestive system     History of IBS    Personal history of other diseases of the digestive system     History of umbilical hernia    Personal history of other diseases of the musculoskeletal system and connective tissue     History of neck pain    Personal history of other specified conditions     History of diarrhea    Personal history of other specified conditions     History of fatigue    Personal history of other specified conditions     History of nocturia    Rash and other nonspecific skin eruption     Skin rash       Social History  Social History     Tobacco Use    Smoking status: Former     Current packs/day: 0.00     Types: Cigarettes     Quit date:      Years since quittin.5    Smokeless tobacco: Never   Vaping Use    Vaping status: Never Used   Substance Use Topics    Alcohol use: Not  Currently    Drug use: Never       Family History     Family History   Problem Relation Name Age of Onset    Kidney failure Mother      Other (CABG) Father         Review of Systems  As per HPI, all other systems reviewed and negative.    Allergies:  No Known Allergies     Outpatient Medications:  Current Outpatient Medications   Medication Instructions    aspirin 81 mg chewable tablet 1 tablet, oral, Daily    atorvastatin (LIPITOR) 80 mg, oral, Nightly    glucosamine-chondroitin 500-400 mg tablet 1 tablet, oral, Daily    ibuprofen (Motrin) capsule oral    lisinopril 5 mg, oral, Daily    lisinopril 10 mg, oral, Daily    meloxicam (MOBIC) 15 mg, oral, Daily    metoprolol tartrate (LOPRESSOR) 50 mg, oral, Daily    multivitamin with minerals iron-free (Centrum Silver) 1 tablet, oral, Daily    omega 3-dha-epa-fish oil (Fish OiL) 1,200 (144-216) mg capsule oral    sertraline (ZOLOFT) 50 mg, oral, 2 times daily         Vitals:  Vitals:    07/15/24 1419   BP: 108/70   Pulse: 69   Temp: 36.3 °C (97.3 °F)       Physical Exam:  Physical Exam  Constitutional:       Appearance: Normal appearance. He is obese.   Neck:      Vascular: No carotid bruit.   Cardiovascular:      Rate and Rhythm: Normal rate and regular rhythm.      Pulses: Normal pulses.      Heart sounds: No murmur heard.  Pulmonary:      Effort: Pulmonary effort is normal.      Breath sounds: Normal breath sounds.   Musculoskeletal:         General: No swelling.   Skin:     General: Skin is warm and dry.   Neurological:      General: No focal deficit present.      Mental Status: He is alert and oriented to person, place, and time.   Psychiatric:         Mood and Affect: Mood normal.         Review of Systems   Constitutional: Negative.    Respiratory: Negative.     Cardiovascular: Negative.    Neurological: Negative.          Assessment/Plan   Problem List Items Addressed This Visit             ICD-10-CM    Benign essential hypertension I10    Hyperlipidemia, mixed  E78.2    CAD (coronary artery disease) I25.10    BMI 36.0-36.9,adult Z68.36    Former smoker Z87.891       Scribe Attestation  By signing my name below, I, Thu Andrew LPN  , Scribe   attest that this documentation has been prepared under the direction and in the presence of Maria Luisa Bryson MD.      Maria Luisa Bryson MD [facility]  Interventional Cardiology   of [facility]     Thank you for allowing me to participate in the care of this patient. Please do not hesitate to contact me with any further questions or concerns.

## 2024-08-25 ENCOUNTER — HOSPITAL ENCOUNTER (EMERGENCY)
Facility: HOSPITAL | Age: 69
Discharge: HOME | End: 2024-08-25
Attending: EMERGENCY MEDICINE
Payer: MEDICARE

## 2024-08-25 ENCOUNTER — APPOINTMENT (OUTPATIENT)
Dept: RADIOLOGY | Facility: HOSPITAL | Age: 69
End: 2024-08-25
Payer: MEDICARE

## 2024-08-25 VITALS
OXYGEN SATURATION: 99 % | RESPIRATION RATE: 16 BRPM | TEMPERATURE: 98.2 F | HEIGHT: 69 IN | BODY MASS INDEX: 35.55 KG/M2 | WEIGHT: 240 LBS | SYSTOLIC BLOOD PRESSURE: 148 MMHG | DIASTOLIC BLOOD PRESSURE: 81 MMHG | HEART RATE: 64 BPM

## 2024-08-25 DIAGNOSIS — M54.2 NECK PAIN: Primary | ICD-10-CM

## 2024-08-25 PROCEDURE — 99283 EMERGENCY DEPT VISIT LOW MDM: CPT

## 2024-08-25 PROCEDURE — 72040 X-RAY EXAM NECK SPINE 2-3 VW: CPT

## 2024-08-25 PROCEDURE — 2500000004 HC RX 250 GENERAL PHARMACY W/ HCPCS (ALT 636 FOR OP/ED)

## 2024-08-25 PROCEDURE — 2500000005 HC RX 250 GENERAL PHARMACY W/O HCPCS

## 2024-08-25 PROCEDURE — 96372 THER/PROPH/DIAG INJ SC/IM: CPT

## 2024-08-25 PROCEDURE — 72040 X-RAY EXAM NECK SPINE 2-3 VW: CPT | Performed by: RADIOLOGY

## 2024-08-25 RX ORDER — LIDOCAINE 50 MG/G
1 PATCH TOPICAL DAILY PRN
Qty: 7 PATCH | Refills: 0 | Status: SHIPPED | OUTPATIENT
Start: 2024-08-25

## 2024-08-25 RX ORDER — NAPROXEN 500 MG/1
500 TABLET ORAL EVERY 12 HOURS PRN
Qty: 20 TABLET | Refills: 0 | Status: SHIPPED | OUTPATIENT
Start: 2024-08-25

## 2024-08-25 RX ORDER — KETOROLAC TROMETHAMINE 15 MG/ML
15 INJECTION, SOLUTION INTRAMUSCULAR; INTRAVENOUS ONCE
Status: COMPLETED | OUTPATIENT
Start: 2024-08-25 | End: 2024-08-25

## 2024-08-25 RX ORDER — LIDOCAINE 560 MG/1
1 PATCH PERCUTANEOUS; TOPICAL; TRANSDERMAL ONCE
Status: DISCONTINUED | OUTPATIENT
Start: 2024-08-25 | End: 2024-08-25 | Stop reason: HOSPADM

## 2024-08-25 RX ORDER — CYCLOBENZAPRINE HCL 5 MG
10 TABLET ORAL EVERY 8 HOURS PRN
Qty: 15 TABLET | Refills: 0 | Status: SHIPPED | OUTPATIENT
Start: 2024-08-25

## 2024-08-25 ASSESSMENT — LIFESTYLE VARIABLES
EVER FELT BAD OR GUILTY ABOUT YOUR DRINKING: NO
HAVE PEOPLE ANNOYED YOU BY CRITICIZING YOUR DRINKING: NO
TOTAL SCORE: 0
HAVE YOU EVER FELT YOU SHOULD CUT DOWN ON YOUR DRINKING: NO
EVER HAD A DRINK FIRST THING IN THE MORNING TO STEADY YOUR NERVES TO GET RID OF A HANGOVER: NO

## 2024-08-25 ASSESSMENT — COLUMBIA-SUICIDE SEVERITY RATING SCALE - C-SSRS
2. HAVE YOU ACTUALLY HAD ANY THOUGHTS OF KILLING YOURSELF?: NO
6. HAVE YOU EVER DONE ANYTHING, STARTED TO DO ANYTHING, OR PREPARED TO DO ANYTHING TO END YOUR LIFE?: NO
1. IN THE PAST MONTH, HAVE YOU WISHED YOU WERE DEAD OR WISHED YOU COULD GO TO SLEEP AND NOT WAKE UP?: NO

## 2024-08-25 ASSESSMENT — PAIN SCALES - GENERAL
PAINLEVEL_OUTOF10: 7
PAINLEVEL_OUTOF10: 6

## 2024-08-25 ASSESSMENT — PAIN DESCRIPTION - FREQUENCY: FREQUENCY: CONSTANT/CONTINUOUS

## 2024-08-25 ASSESSMENT — PAIN - FUNCTIONAL ASSESSMENT
PAIN_FUNCTIONAL_ASSESSMENT: 0-10
PAIN_FUNCTIONAL_ASSESSMENT: 0-10

## 2024-08-25 ASSESSMENT — PAIN DESCRIPTION - DESCRIPTORS: DESCRIPTORS: DISCOMFORT

## 2024-08-25 ASSESSMENT — PAIN DESCRIPTION - ONSET: ONSET: ONGOING

## 2024-08-25 ASSESSMENT — PAIN DESCRIPTION - PROGRESSION: CLINICAL_PROGRESSION: GRADUALLY WORSENING

## 2024-08-25 ASSESSMENT — PAIN DESCRIPTION - PAIN TYPE: TYPE: ACUTE PAIN

## 2024-08-25 ASSESSMENT — PAIN DESCRIPTION - LOCATION
LOCATION: NECK
LOCATION: NECK

## 2024-08-25 ASSESSMENT — PAIN DESCRIPTION - ORIENTATION: ORIENTATION: MID

## 2024-08-25 NOTE — ED PROVIDER NOTES
Emergency Department Provider Note        History of Present Illness     History provided by: Patient  Limitations to History: None  External Records Reviewed with Brief Summary: None    HPI:  Jayesh Metcalf is a 69 y.o. male presents emergency department with chief complaint of neck pain.  Patient indicates that for approximately a week his neck is bothering him.  He denies any trauma falls or concerning symptoms.  The patient does not have weakness or numbness in any of his extremities he does endorse having a mild pain in his right shoulder.  Review of systems otherwise negative    Physical Exam   Triage vitals:  T 36.3 °C (97.3 °F)  HR 60  BP (!) 157/94  RR 18  O2 95 % None (Room air)    Physical Exam  Vitals and nursing note reviewed.   Constitutional:       General: He is not in acute distress.     Appearance: He is well-developed.   HENT:      Head: Normocephalic and atraumatic.   Eyes:      Conjunctiva/sclera: Conjunctivae normal.   Neck:      Comments: Cervical approximately C6-C7 has mild protuberance pain to palpitation patient Dors that feels uncomfortable.  Cardiovascular:      Rate and Rhythm: Normal rate and regular rhythm.      Heart sounds: No murmur heard.  Pulmonary:      Effort: Pulmonary effort is normal. No respiratory distress.      Breath sounds: Normal breath sounds.   Abdominal:      Palpations: Abdomen is soft.      Tenderness: There is no abdominal tenderness.   Musculoskeletal:         General: No swelling.      Cervical back: Neck supple.   Skin:     General: Skin is warm and dry.      Capillary Refill: Capillary refill takes less than 2 seconds.   Neurological:      Mental Status: He is alert.   Psychiatric:         Mood and Affect: Mood normal.          Medical Decision Making & ED Course   Medical Decision Makin y.o. male presents emergency department chief complaint of neck pain.  Medical management treatment emergency department will consist of Toradol shot as well as a  Lidoderm patch for pain management.  Obtain a cervical spine x-ray.  X-ray of the cervical spine shows no acute fracture dislocation or subluxation.  The patient has been informed we have provided the patient with neurology follow-up.  Low clinical suspicion for cerebral mildly or Budd-Chiari malformation as patient strength is intact this is an acute finding.  Patient will be discharged home with strict return precautions.  ----      Differential diagnoses considered include but are not limited to: None     Social Determinants of Health which Significantly Impact Care: None identified       Independent Result Review and Interpretation: Relevant laboratory and radiographic results were reviewed and independently interpreted by myself.  As necessary, they are commented on in the ED Course.    Chronic conditions affecting the patient's care: As documented above in MDM    The patient was discussed with the following consultants/services: None we have provided the patient with neurosurgery follow-up documentation in the paperwork.  For discharge.    Care Considerations: None    ED Course:  Diagnoses as of 08/25/24 1756   Neck pain     Disposition   As a result of the work-up, the patient was discharged home.  he was informed of his diagnosis and instructed to come back with any concerns or worsening of condition.  he and was agreeable to the plan as discussed above.  he was given the opportunity to ask questions.  All of the patient's questions were answered.    Procedures   Procedures        Shun Kidd,   Emergency Medicine    =================Attending note===============    The patient was seen by the resident/fellow.  I have personally performed a substantive portion of the encounter.  I have seen and examined the patient; agree with the workup, evaluation, MDM,   management and diagnosis.  The care plan has been discussed with the resident; I have reviewed the resident’s note and agree with the documented  findings.      This is a 69 y.o. male who presents to ER with neck pain for 1 week.  No specific injury.  He states pain is worse with certain movements of his shoulders and his neck.  No specific injury.  No history of neck pain.  No fevers or chills.  No nausea or vomiting.  No chest pain or shortness of breath.  No numbness or tingling.  Patient does have a history of cardiac stents.  He does not have any diabetes.  He does have hypertension hyperlipidemia.    Full strength and range of motion of the upper extremities against resistance in all directions.  Radial and ulnar pulses intact.  Cap refill brisk.  Sensation intact.  Deep tendon reflexes are 1+ at the bilateral upper extremities.  Full  strength.  There is some mild generalized tenderness over the lower cervical spine.  No focal point tenderness.  His pain is worse when he looks up.    He is discharged home with naproxen, Flexeril, lidocaine patches.  Is to follow-up with primary care and neurosurgery.  He is to return to the nearest ER for any new or worsening symptoms.            ==========================================       Shun Kidd DO  08/26/24 [id number]

## 2024-08-25 NOTE — DISCHARGE INSTRUCTIONS
Seek immediate medical attention if you develop: increasing pain, numbness, tingling, weakness, loss of motion in your arms or legs, loss of control of your urine or stool, fever, abdominal pain, chest pain, shortness of breath, or any new or worsening symptoms.

## 2024-09-16 ENCOUNTER — OFFICE VISIT (OUTPATIENT)
Dept: ORTHOPEDIC SURGERY | Facility: CLINIC | Age: 69
End: 2024-09-16
Payer: MEDICARE

## 2024-09-16 VITALS — HEIGHT: 69 IN | WEIGHT: 240 LBS | BODY MASS INDEX: 35.55 KG/M2

## 2024-09-16 DIAGNOSIS — M54.2 NECK PAIN: Primary | ICD-10-CM

## 2024-09-16 DIAGNOSIS — M50.30 DDD (DEGENERATIVE DISC DISEASE), CERVICAL: ICD-10-CM

## 2024-09-16 DIAGNOSIS — M54.2 NECK PAIN: ICD-10-CM

## 2024-09-16 PROCEDURE — 99214 OFFICE O/P EST MOD 30 MIN: CPT | Performed by: PHYSICIAN ASSISTANT

## 2024-09-16 PROCEDURE — 1159F MED LIST DOCD IN RCRD: CPT | Performed by: PHYSICIAN ASSISTANT

## 2024-09-16 PROCEDURE — 1036F TOBACCO NON-USER: CPT | Performed by: PHYSICIAN ASSISTANT

## 2024-09-16 PROCEDURE — 3008F BODY MASS INDEX DOCD: CPT | Performed by: PHYSICIAN ASSISTANT

## 2024-09-16 PROCEDURE — 1125F AMNT PAIN NOTED PAIN PRSNT: CPT | Performed by: PHYSICIAN ASSISTANT

## 2024-09-16 PROCEDURE — 99204 OFFICE O/P NEW MOD 45 MIN: CPT | Performed by: PHYSICIAN ASSISTANT

## 2024-09-16 RX ORDER — CYCLOBENZAPRINE HCL 10 MG
10 TABLET ORAL NIGHTLY PRN
Qty: 30 TABLET | Refills: 0 | Status: SHIPPED | OUTPATIENT
Start: 2024-09-16 | End: 2024-10-16

## 2024-09-16 RX ORDER — PREDNISONE 10 MG/1
TABLET ORAL
Qty: 30 TABLET | Refills: 0 | Status: SHIPPED | OUTPATIENT
Start: 2024-09-16

## 2024-09-16 ASSESSMENT — PAIN - FUNCTIONAL ASSESSMENT: PAIN_FUNCTIONAL_ASSESSMENT: 0-10

## 2024-09-16 ASSESSMENT — PAIN SCALES - GENERAL: PAINLEVEL_OUTOF10: 6

## 2024-09-16 NOTE — PROGRESS NOTES
Jayesh Metcalf is a 69 y.o. male who presents for neck pain.    HPI:  69-year-old gentleman here for new patient visit of neck pain.  He denies any fever chills nausea vomiting night sweats.  He has no bowel or bladder complaints.    Physical exam:  Well-nourished, well kept.  No lymphangitis or lymphadenopathy in the examined extremities. Affect normal.  Alert and oriented X 3.  Coordination normal.  Patient can rise from a seated position, can sit from a standing position. Can stand on heels and toes.  Patient is tender in the paraspinal musculature of the cervical spine. range of motion is mildly decreased secondary to some pain and stiffness no weakness no instability to muscle strength. examination of the upper extremities reveals no point tenderness, swelling, or deformity.  Range of motion of the shoulders, elbows, wrists, and fingers are full without crepitance, instability, or exacerbation of pain. Strength is 5/5 throughout. no redness, abrasions, or lesions on the upper extremities bilaterally.  Gross sensation intact to the extremities.  Deep tendon reflexes 1+ and symmetric bilaterally.  Bishop negative.     Imaging studies:  X-rays of the cervical spine from August 25, 2024 were reviewed today.    Assessment:  69-year-old gentleman here with about a 1 month history of just neck pain.  He gets a little parascapular pain along the posterior trapezius bilaterally, nothing radiating down into the arms.  No numbness or tingling in the upper extremities.  There was no precipitating event.  He does lift weights and he is active.  He did go to the emergency department on August 25, 2024, he was treated with some anti-inflammatory muscle relaxer and lidocaine patch.  Overall he does feel better now than he did when this initially started but it just is not going away when he is physically active.  No history of physical therapy or chiropractic care.  No history of neck surgery.  His x-rays show mild degenerative  changes throughout the cervical spine.    Plan:  I would like to get him into some physical therapy, but the patient wants to wait on that.  We can give him a 10-day steroid taper, and some muscle relaxer to take at night at bedtime and I will see him back in 1 month.  If he is no better than he is now in 1 month, I would encourage him to do physical therapy and we may consider getting an MRI and working this up further.    I have reviewed test today x-rays.  I reviewed the emergency department note from August 25, 2024.  This is an undiagnosed new problem with uncertain prognosis.  We are treating this with prescription medication.    Shun Brink PA-C

## 2024-10-21 ENCOUNTER — OFFICE VISIT (OUTPATIENT)
Dept: ORTHOPEDIC SURGERY | Facility: CLINIC | Age: 69
End: 2024-10-21
Payer: MEDICARE

## 2024-10-21 DIAGNOSIS — M54.2 NECK PAIN: Primary | ICD-10-CM

## 2024-10-21 PROCEDURE — 99213 OFFICE O/P EST LOW 20 MIN: CPT | Performed by: PHYSICIAN ASSISTANT

## 2024-10-21 PROCEDURE — 99214 OFFICE O/P EST MOD 30 MIN: CPT | Performed by: PHYSICIAN ASSISTANT

## 2024-10-21 NOTE — PROGRESS NOTES
Jayesh Metcalf is a 69 y.o. male who presents for Follow-up of the Neck.    HPI:  69-year-old gentleman here for follow-up of neck pain.  THe steroids did not give him any long-term relief.  He denies any fever chills nausea vomiting night sweats.  He has no bowel or bladder complaints.    Physical exam:  Well-nourished, well kept.  No lymphangitis or lymphadenopathy in the examined extremities. Affect normal.  Alert and oriented X 3.  Coordination normal.  Patient can rise from a seated position, can sit from a standing position. Can stand on heels and toes.  Patient is tender in the paraspinal musculature of the cervical spine. range of motion is mildly decreased secondary to some pain and stiffness no weakness no instability to muscle strength. examination of the upper extremities reveals no point tenderness, swelling, or deformity.  Range of motion of the shoulders, elbows, wrists, and fingers are full without crepitance, instability, or exacerbation of pain. Strength is 5/5 throughout. no redness, abrasions, or lesions on the upper extremities bilaterally.  Gross sensation intact to the extremities.  Deep tendon reflexes 1+ and symmetric bilaterally.  Bishop negative.     Assessment:  69-year-old gentleman here for follow-up of neck pain.  The short-term steroid pack gave him good relief of his symptoms while he was taking it, once he finished the steroid medication his symptoms returned.  He is having numbness tingling and an achy sensation throughout the central parascapular area and across the posterior trapezius muscles bilaterally and into the base of the neck.  He is not describing any radicular symptoms down the arm.  This is starting to affect his bodily function.  He has done physical therapy in the past for other similar things but never with any long-term help.    Plan:  I think since he failed conservative medication treatment I would like to get an MRI of his cervical spine using Dr. Rodriguez's  cervical spine protocol from [facility].  I will see him back after the MRI.    Shun Brink PA-C

## 2024-10-23 ENCOUNTER — HOSPITAL ENCOUNTER (OUTPATIENT)
Dept: RADIOLOGY | Facility: CLINIC | Age: 69
Discharge: HOME | End: 2024-10-23
Payer: MEDICARE

## 2024-10-23 DIAGNOSIS — M54.2 NECK PAIN: ICD-10-CM

## 2024-10-28 ENCOUNTER — HOSPITAL ENCOUNTER (OUTPATIENT)
Dept: RADIOLOGY | Facility: HOSPITAL | Age: 69
Discharge: HOME | End: 2024-10-28
Payer: MEDICARE

## 2024-10-28 PROCEDURE — 72141 MRI NECK SPINE W/O DYE: CPT | Performed by: RADIOLOGY

## 2024-10-28 PROCEDURE — 72141 MRI NECK SPINE W/O DYE: CPT

## 2024-10-31 ENCOUNTER — OFFICE VISIT (OUTPATIENT)
Dept: PRIMARY CARE | Facility: CLINIC | Age: 69
End: 2024-10-31
Payer: MEDICARE

## 2024-10-31 VITALS
DIASTOLIC BLOOD PRESSURE: 92 MMHG | WEIGHT: 245 LBS | SYSTOLIC BLOOD PRESSURE: 137 MMHG | HEART RATE: 55 BPM | BODY MASS INDEX: 36.29 KG/M2 | HEIGHT: 69 IN

## 2024-10-31 DIAGNOSIS — R35.1 NOCTURIA: ICD-10-CM

## 2024-10-31 DIAGNOSIS — M50.30 DDD (DEGENERATIVE DISC DISEASE), CERVICAL: ICD-10-CM

## 2024-10-31 DIAGNOSIS — M54.2 NECK PAIN: ICD-10-CM

## 2024-10-31 DIAGNOSIS — R35.1 BENIGN PROSTATIC HYPERPLASIA WITH NOCTURIA: ICD-10-CM

## 2024-10-31 DIAGNOSIS — N40.1 BENIGN PROSTATIC HYPERPLASIA WITH NOCTURIA: ICD-10-CM

## 2024-10-31 DIAGNOSIS — E88.810 DYSMETABOLIC SYNDROME X: ICD-10-CM

## 2024-10-31 DIAGNOSIS — G47.33 OBSTRUCTIVE SLEEP APNEA SYNDROME: ICD-10-CM

## 2024-10-31 DIAGNOSIS — E78.2 HYPERLIPIDEMIA, MIXED: ICD-10-CM

## 2024-10-31 DIAGNOSIS — I10 BENIGN ESSENTIAL HYPERTENSION: Primary | ICD-10-CM

## 2024-10-31 LAB
BILIRUBIN, POC: NEGATIVE
BLOOD URINE, POC: NEGATIVE
GLUCOSE URINE, POC: NEGATIVE
KETONES, POC: NEGATIVE
LEUKOCYTE EST, POC: NEGATIVE
NITRITE, POC: NEGATIVE
PH, POC: 5
SPECIFIC GRAVITY, POC: 1.03
URINE PROTEIN, POC: NEGATIVE
UROBILINOGEN, POC: 0.2

## 2024-10-31 PROCEDURE — 3080F DIAST BP >= 90 MM HG: CPT | Performed by: INTERNAL MEDICINE

## 2024-10-31 PROCEDURE — 81002 URINALYSIS NONAUTO W/O SCOPE: CPT | Performed by: INTERNAL MEDICINE

## 2024-10-31 PROCEDURE — 1160F RVW MEDS BY RX/DR IN RCRD: CPT | Performed by: INTERNAL MEDICINE

## 2024-10-31 PROCEDURE — 87086 URINE CULTURE/COLONY COUNT: CPT

## 2024-10-31 PROCEDURE — 1036F TOBACCO NON-USER: CPT | Performed by: INTERNAL MEDICINE

## 2024-10-31 PROCEDURE — G2211 COMPLEX E/M VISIT ADD ON: HCPCS | Performed by: INTERNAL MEDICINE

## 2024-10-31 PROCEDURE — 3075F SYST BP GE 130 - 139MM HG: CPT | Performed by: INTERNAL MEDICINE

## 2024-10-31 PROCEDURE — 99214 OFFICE O/P EST MOD 30 MIN: CPT | Performed by: INTERNAL MEDICINE

## 2024-10-31 PROCEDURE — 1159F MED LIST DOCD IN RCRD: CPT | Performed by: INTERNAL MEDICINE

## 2024-10-31 PROCEDURE — 3008F BODY MASS INDEX DOCD: CPT | Performed by: INTERNAL MEDICINE

## 2024-10-31 RX ORDER — DOXAZOSIN 2 MG/1
2 TABLET ORAL NIGHTLY
Qty: 30 TABLET | Refills: 2 | Status: SHIPPED | OUTPATIENT
Start: 2024-10-31 | End: 2025-04-29

## 2024-10-31 RX ORDER — CYCLOBENZAPRINE HCL 10 MG
10 TABLET ORAL NIGHTLY PRN
Qty: 20 TABLET | Refills: 0 | Status: SHIPPED | OUTPATIENT
Start: 2024-10-31 | End: 2024-11-30

## 2024-10-31 ASSESSMENT — PATIENT HEALTH QUESTIONNAIRE - PHQ9
2. FEELING DOWN, DEPRESSED OR HOPELESS: NOT AT ALL
1. LITTLE INTEREST OR PLEASURE IN DOING THINGS: NOT AT ALL
SUM OF ALL RESPONSES TO PHQ9 QUESTIONS 1 AND 2: 0

## 2024-10-31 ASSESSMENT — LIFESTYLE VARIABLES
AUDIT-C TOTAL SCORE: 1
HAVE YOU OR SOMEONE ELSE BEEN INJURED AS A RESULT OF YOUR DRINKING: NO
HOW MANY STANDARD DRINKS CONTAINING ALCOHOL DO YOU HAVE ON A TYPICAL DAY: 1 OR 2
HOW OFTEN DO YOU HAVE SIX OR MORE DRINKS ON ONE OCCASION: NEVER
AUDIT TOTAL SCORE: 1
HAS A RELATIVE, FRIEND, DOCTOR, OR ANOTHER HEALTH PROFESSIONAL EXPRESSED CONCERN ABOUT YOUR DRINKING OR SUGGESTED YOU CUT DOWN: NO
SKIP TO QUESTIONS 9-10: 1
HOW OFTEN DO YOU HAVE A DRINK CONTAINING ALCOHOL: MONTHLY OR LESS

## 2024-11-01 LAB — BACTERIA UR CULT: NORMAL

## 2024-11-04 ENCOUNTER — OFFICE VISIT (OUTPATIENT)
Dept: ORTHOPEDIC SURGERY | Facility: CLINIC | Age: 69
End: 2024-11-04
Payer: MEDICARE

## 2024-11-04 DIAGNOSIS — M54.2 NECK PAIN: Primary | ICD-10-CM

## 2024-11-04 PROCEDURE — 99213 OFFICE O/P EST LOW 20 MIN: CPT | Performed by: PHYSICIAN ASSISTANT

## 2024-11-04 NOTE — PROGRESS NOTES
Jayesh Metcalf is a 69 y.o. male who presents for Follow-up of the Neck (MRI Review).    HPI:  69-year-old gentleman here for follow-up neck MRI results.  He denies any fever chills nausea vomiting night sweats.  He has no bowel or bladder complaints.    Physical exam:  Well-nourished, well kept.  No lymphangitis or lymphadenopathy in the examined extremities. Affect normal.  Alert and oriented X 3.  Coordination normal.  Patient can rise from a seated position, can sit from a standing position. Can stand on heels and toes.  Patient is tender in the paraspinal musculature of the cervical spine. range of motion is mildly decreased secondary to some pain and stiffness no weakness no instability to muscle strength. examination of the upper extremities reveals no point tenderness, swelling, or deformity.  Range of motion of the shoulders, elbows, wrists, and fingers are full without crepitance, instability, or exacerbation of pain. Strength is 5/5 throughout. no redness, abrasions, or lesions on the upper extremities bilaterally.  Gross sensation intact to the extremities.  Deep tendon reflexes 2+ and symmetric bilaterally.  Bishop negative.     Imaging studies:  An MRI of the cervical spine from October 28, 2024 was reviewed today.  Report was reviewed.    Assessment:  69-year-old gentleman here for follow-up neck MRI results.  He has not had any change in symptoms, he feels about the same as he did at his last visit.  He is having parascapular pain centrally, and he has trapezius pain numbness and tingling bilaterally going up into the back of the neck and the base of the skull.  He is not describing any pain out into his arms wrists or hands.  He has not yet done any physical therapy for this.  No history of neck surgery.  His MRI shows some central and left stenosis most severely at C6-7.  He has a couple of other levels that have small disc bulges with mild foraminal narrowing.    Plan:  I would like to get him into  some physical therapy, he has not done this yet.  I think I would like to get him somewhere with modalities and manual therapy as well.  I will see him back in about 6 weeks.    Shun Brink PA-C

## 2024-11-26 ENCOUNTER — TELEPHONE (OUTPATIENT)
Dept: PRIMARY CARE | Facility: CLINIC | Age: 69
End: 2024-11-26
Payer: MEDICARE

## 2024-11-26 DIAGNOSIS — R35.1 BENIGN PROSTATIC HYPERPLASIA WITH NOCTURIA: ICD-10-CM

## 2024-11-26 DIAGNOSIS — N40.1 BENIGN PROSTATIC HYPERPLASIA WITH NOCTURIA: ICD-10-CM

## 2024-11-26 DIAGNOSIS — R35.1 NOCTURIA: ICD-10-CM

## 2024-11-26 DIAGNOSIS — I10 BENIGN ESSENTIAL HYPERTENSION: ICD-10-CM

## 2024-11-26 RX ORDER — DOXAZOSIN 4 MG/1
4 TABLET ORAL NIGHTLY
Qty: 30 TABLET | Refills: 0 | Status: SHIPPED | OUTPATIENT
Start: 2024-11-26 | End: 2025-05-25

## 2024-11-26 NOTE — TELEPHONE ENCOUNTER
Pt left  stating that he would like to increase doxazosin 2Mg to 4 mg nightly.  States that he is still getting up at night.  Please send new Rx to [facility] in [place].

## 2024-12-16 ENCOUNTER — OFFICE VISIT (OUTPATIENT)
Dept: ORTHOPEDIC SURGERY | Facility: CLINIC | Age: 69
End: 2024-12-16
Payer: MEDICARE

## 2024-12-16 DIAGNOSIS — M54.12 CERVICAL RADICULOPATHY: Primary | ICD-10-CM

## 2024-12-16 PROCEDURE — 99213 OFFICE O/P EST LOW 20 MIN: CPT | Performed by: PHYSICIAN ASSISTANT

## 2024-12-16 NOTE — PROGRESS NOTES
Jayesh Metcalf is a 69 y.o. male who presents for Follow-up of the Neck (He states he is feeling good with no complaints./S/p PT/Xr@ 8/25/24, MRI@ 10/28/24).    HPI:  69-year-old gentleman here for follow-up of neck and radicular symptoms after physical therapy.  He is doing well.  He denies any fever chills nausea vomiting night sweats.  He has no bowel or bladder complaints.    Physical exam:  Well-nourished, well kept.  No lymphangitis or lymphadenopathy in the examined extremities. Affect normal.  Alert and oriented X 3.  Coordination normal.  Patient can rise from a seated position, can sit from a standing position. Can stand on heels and toes.  Patient is mom tender in the paraspinal musculature of the cervical spine. range of motion is very mildly decreased secondary to some pain and stiffness no weakness no instability to muscle strength. examination of the upper extremities reveals no point tenderness, swelling, or deformity.  Range of motion of the shoulders, elbows, wrists, and fingers are full without crepitance, instability, or exacerbation of pain. Strength is 5/5 throughout. no redness, abrasions, or lesions on the upper extremities bilaterally.  Gross sensation intact to the extremities.  Deep tendon reflexes 1+ and symmetric bilaterally.  Bishop negative.     Assessment:  69-year-old gentleman here for follow-up of neck and radicular symptoms after physical therapy.  See previous notes for details.  He was having neck pain and bilateral radicular symptoms.  MRI review shows that he has some central and left stenosis most severely at C6-7.  There are degenerative changes throughout the cervical spine.  Helpful course of physical therapy and he feels almost 100% better.  He has just some occasional numbness or ache in his neck but overall he is significantly improved since prior to therapy.  He would like to continue therapy if possible, so we will give him a new order for that.  At this point this  is nonsurgical treatment.    Plan:  We are going to let him engage in activities as tolerated, we can give him a new order for physical therapy and he can engage in that as needed.  We will see him on an as-needed basis.  I would be happy to see him back if anything changes or new symptoms develop.    Shun Brink PA-C

## 2024-12-26 ENCOUNTER — APPOINTMENT (OUTPATIENT)
Dept: PRIMARY CARE | Facility: CLINIC | Age: 69
End: 2024-12-26
Payer: MEDICARE

## 2024-12-26 VITALS
HEART RATE: 53 BPM | DIASTOLIC BLOOD PRESSURE: 71 MMHG | WEIGHT: 251 LBS | SYSTOLIC BLOOD PRESSURE: 128 MMHG | HEIGHT: 69 IN | BODY MASS INDEX: 37.18 KG/M2

## 2024-12-26 DIAGNOSIS — I25.85 CHRONIC CORONARY MICROVASCULAR DYSFUNCTION: ICD-10-CM

## 2024-12-26 DIAGNOSIS — E88.810 DYSMETABOLIC SYNDROME X: ICD-10-CM

## 2024-12-26 DIAGNOSIS — E55.9 VITAMIN D DEFICIENCY: ICD-10-CM

## 2024-12-26 DIAGNOSIS — D17.1 LIPOMA OF ABDOMINAL WALL: ICD-10-CM

## 2024-12-26 DIAGNOSIS — E78.2 HYPERLIPIDEMIA, MIXED: Primary | ICD-10-CM

## 2024-12-26 DIAGNOSIS — N40.1 BENIGN PROSTATIC HYPERPLASIA WITH NOCTURIA: ICD-10-CM

## 2024-12-26 DIAGNOSIS — I10 BENIGN ESSENTIAL HYPERTENSION: ICD-10-CM

## 2024-12-26 DIAGNOSIS — I25.10 CORONARY ARTERY DISEASE INVOLVING NATIVE CORONARY ARTERY OF NATIVE HEART WITHOUT ANGINA PECTORIS: ICD-10-CM

## 2024-12-26 DIAGNOSIS — R35.1 BENIGN PROSTATIC HYPERPLASIA WITH NOCTURIA: ICD-10-CM

## 2024-12-26 DIAGNOSIS — R35.1 NOCTURIA: ICD-10-CM

## 2024-12-26 DIAGNOSIS — E66.9 OBESITY (BMI 30-39.9): ICD-10-CM

## 2024-12-26 LAB
BILIRUBIN, POC: NEGATIVE
BLOOD URINE, POC: NEGATIVE
GLUCOSE URINE, POC: NEGATIVE
KETONES, POC: NEGATIVE
LEUKOCYTE EST, POC: NEGATIVE
NITRITE, POC: NEGATIVE
PH, POC: 5.5
SPECIFIC GRAVITY, POC: 1.03
URINE PROTEIN, POC: NEGATIVE
UROBILINOGEN, POC: 0.2

## 2024-12-26 PROCEDURE — G2211 COMPLEX E/M VISIT ADD ON: HCPCS | Performed by: INTERNAL MEDICINE

## 2024-12-26 PROCEDURE — 99214 OFFICE O/P EST MOD 30 MIN: CPT | Performed by: INTERNAL MEDICINE

## 2024-12-26 PROCEDURE — 1036F TOBACCO NON-USER: CPT | Performed by: INTERNAL MEDICINE

## 2024-12-26 PROCEDURE — 1159F MED LIST DOCD IN RCRD: CPT | Performed by: INTERNAL MEDICINE

## 2024-12-26 PROCEDURE — 81002 URINALYSIS NONAUTO W/O SCOPE: CPT | Performed by: INTERNAL MEDICINE

## 2024-12-26 PROCEDURE — 3008F BODY MASS INDEX DOCD: CPT | Performed by: INTERNAL MEDICINE

## 2024-12-26 PROCEDURE — 3074F SYST BP LT 130 MM HG: CPT | Performed by: INTERNAL MEDICINE

## 2024-12-26 PROCEDURE — 3078F DIAST BP <80 MM HG: CPT | Performed by: INTERNAL MEDICINE

## 2024-12-26 PROCEDURE — 1160F RVW MEDS BY RX/DR IN RCRD: CPT | Performed by: INTERNAL MEDICINE

## 2024-12-26 PROCEDURE — 87086 URINE CULTURE/COLONY COUNT: CPT

## 2024-12-26 RX ORDER — DOXAZOSIN 4 MG/1
4 TABLET ORAL NIGHTLY
Qty: 90 TABLET | Refills: 1 | Status: SHIPPED | OUTPATIENT
Start: 2024-12-26 | End: 2025-06-24

## 2024-12-26 RX ORDER — DOXAZOSIN 4 MG/1
4 TABLET ORAL NIGHTLY
Qty: 90 TABLET | Refills: 1 | Status: SHIPPED | OUTPATIENT
Start: 2024-12-26 | End: 2024-12-26

## 2024-12-26 ASSESSMENT — LIFESTYLE VARIABLES
HAS A RELATIVE, FRIEND, DOCTOR, OR ANOTHER HEALTH PROFESSIONAL EXPRESSED CONCERN ABOUT YOUR DRINKING OR SUGGESTED YOU CUT DOWN: NO
AUDIT-C TOTAL SCORE: 1
AUDIT TOTAL SCORE: 1
HOW OFTEN DO YOU HAVE SIX OR MORE DRINKS ON ONE OCCASION: NEVER
HOW OFTEN DO YOU HAVE A DRINK CONTAINING ALCOHOL: MONTHLY OR LESS
HAVE YOU OR SOMEONE ELSE BEEN INJURED AS A RESULT OF YOUR DRINKING: NO
SKIP TO QUESTIONS 9-10: 1
HOW MANY STANDARD DRINKS CONTAINING ALCOHOL DO YOU HAVE ON A TYPICAL DAY: 1 OR 2

## 2024-12-26 ASSESSMENT — PATIENT HEALTH QUESTIONNAIRE - PHQ9
SUM OF ALL RESPONSES TO PHQ9 QUESTIONS 1 AND 2: 0
1. LITTLE INTEREST OR PLEASURE IN DOING THINGS: NOT AT ALL
2. FEELING DOWN, DEPRESSED OR HOPELESS: NOT AT ALL

## 2024-12-26 NOTE — PROGRESS NOTES
Assessment/Plan   69 years old male who has hypertension hyperlipidemia and coronary artery disease has metabolic syndrome.  I discussed in detail and advised the patient to have the optimal lifestyle modification including weight loss.  Also we discussed about starting metformin which he declines.    Patient has symptoms of BPH.  His PSA 6 months ago was normal.  As per the history there is no urinary obstruction but I advised him to do a ultrasound of the bladder with post residual urine volume which he declines.  We discussed about the prostate and the symptoms of BPH.  Discussed the treatment options but I explained to him Flomax and the doxazosin are both alpha-blocker and at this point he does not need the Flomax.  After detailed discussion he verbalizes the understanding.  Will do an ultrasound of the prostate to make sure there is no suspicious nodules.  If his symptoms continues we could try finasteride but also I advised him it may be good to see a urologist which he declines.  Will do a urine testing to make sure there is no urinary tract infection.    Patient has a subcutaneous nodule in the left abdominal wall which clinically seems to be lipoma.  Differential diagnosis was reviewed and discussed and patient will observe it and let me know whether he would want it to be removed by his surgeon.    Patient will be followed up in 4 to 5 months of his choice since he wants to coordinate the visit with the cardiologist to do the blood test.          Problem List Items Addressed This Visit       Benign essential hypertension    Relevant Medications    doxazosin (Cardura) 4 mg tablet    Other Relevant Orders    CBC and Auto Differential    Hemoglobin A1C    Dysmetabolic syndrome X    Relevant Orders    Hemoglobin A1C    Hyperlipidemia, mixed - Primary    Relevant Orders    CBC and Auto Differential    Comprehensive Metabolic Panel    Lipid Panel    CAD (coronary artery disease)    Relevant Orders    Hemoglobin  A1C    Hemoglobin A1C    Obesity (BMI 30-39.9)    Nocturia    Relevant Medications    doxazosin (Cardura) 4 mg tablet    Other Relevant Orders    US prostate transrectal    POCT urinalysis dipstick manually resulted    Urine Culture    Benign prostatic hyperplasia with nocturia    Relevant Medications    doxazosin (Cardura) 4 mg tablet    Other Relevant Orders    US prostate transrectal    POCT urinalysis dipstick manually resulted    Urine Culture    Lipoma of abdominal wall     Other Visit Diagnoses       Vitamin D deficiency        Relevant Orders    Vitamin D 25-Hydroxy,Total (for eval of Vitamin D levels)            Subjective     Patient ID: Jayesh Metcalf is a 69 y.o. male who presents for Follow-up.    History of present illness  69 years old male who has history of coronary artery disease and has had stent in the past came for a follow-up visit mainly to review his blood pressure medication and also he wants to talk about Flomax.    Patient has nocturia once or twice a night also he says that sometimes he feels like he is dribbling at the end of the urination.  But he feels like he is emptying well.  He denies any burning urination no fever no chills no lower abdominal pain.  He is tolerating the doxazosin which was increased recently to 4 mg.    He denies any chest pain no short of breath.  He is not taking vitamin D.    He is concerned about a small lumpy area in the left abdominal wall.    He admits that he has hard time losing weight because he eats lots of sweets.  But he is active and he walks.    He has no other significant complaints on review of system.    Family History   Problem Relation Name Age of Onset   • Kidney failure Mother     • Other (CABG) Father        Social History     Socioeconomic History   • Marital status: Single     Spouse name: Not on file   • Number of children: Not on file   • Years of education: Not on file   • Highest education level: Not on file   Occupational History   •  Not on file   Tobacco Use   • Smoking status: Former     Current packs/day: 0.00     Types: Cigarettes     Quit date:      Years since quittin.0   • Smokeless tobacco: Never   Vaping Use   • Vaping status: Never Used   Substance and Sexual Activity   • Alcohol use: Yes   • Drug use: Never   • Sexual activity: Yes   Other Topics Concern   • Not on file   Social History Narrative   • Not on file     Social Drivers of Health     Financial Resource Strain: Not on file   Food Insecurity: Not on file   Transportation Needs: Not on file   Physical Activity: Not on file   Stress: Not on file   Social Connections: Not on file   Intimate Partner Violence: Not on file   Housing Stability: Not on file      Patient has no known allergies.   Current Outpatient Medications   Medication Sig Dispense Refill   • aspirin 81 mg chewable tablet Chew 1 tablet (81 mg) once daily.     • atorvastatin (Lipitor) 80 mg tablet TAKE 1 TABLET BY MOUTH EVERYDAY AT BEDTIME 90 tablet 3   • glucosamine-chondroitin 500-400 mg tablet Take 1 tablet by mouth once daily.     • ibuprofen (Motrin) capsule Take by mouth.     • lisinopril 10 mg tablet TAKE 1 TABLET BY MOUTH EVERY DAY 90 tablet 3   • metoprolol tartrate (Lopressor) 50 mg tablet TAKE 1 TABLET DAILY 90 tablet 3   • multivitamin with minerals iron-free (Centrum Silver) Take 1 tablet by mouth once daily.     • omega 3-dha-epa-fish oil (Fish OiL) 1,200 (144-216) mg capsule Take by mouth.     • sertraline (Zoloft) 50 mg tablet Take 1 tablet (50 mg) by mouth 2 times a day. 180 tablet 3   • cyclobenzaprine (Flexeril) 10 mg tablet Take 1 tablet (10 mg) by mouth as needed at bedtime for muscle spasms. 20 tablet 0   • doxazosin (Cardura) 4 mg tablet Take 1 tablet (4 mg) by mouth once daily at bedtime. 90 tablet 1     No current facility-administered medications for this visit.       Objective     Vitals:    24 0954   BP: 128/71   Pulse: 53        Physical Exam  Moderate obese,  well-nourished  with no apparent distress. Alert oriented  Skin:  Normal turgor.  No rash.  Head:  Normocephalic, atraumatic.  Eyes:  Pupils are equal, round,.  No pallor of conjunctivae.  Mouth has moist oral mucosa.  Neck:  Supple.  Neck movements slightly restricted but no pain or tenderness in the neck.  No swelling or redness.  No arm weakness.  No sensory loss in the arms.  No JVD.  No carotid bruit.  No thyromegaly. No cervical lymphadenopathy.  No clubbing  Chest:  Vesicular breathing Bilaterally good air entry and bilaterally clear to auscultation.  No wheezing.  No crackles.  Heart:  Regular rate and rhythm.  S1, S2 positive.  No murmur.  Abdomen:  Soft and nontender.  Bowel sounds are positive.  No organomegaly.  Left lateral lower chest area at the axillary line and the umbilical level patient has a subcutaneous lump measured 1.5 x 1.5 inches with some lobulation.  Mobile.  No punctum.  Most suggestive of lipoma  Extremities:  Left knee has no swelling and a detailed exam deferred since he is seeing the orthopedic today.  Bilaterally no pedal pitting edema.  Bilaterally 2+ dorsalis pedis pulses.    Left leg varicose veins noted but no significant dermatitis or other acute changes.  No calf tenderness. Homans sign is negative.  Neuro Exam: No focal signs. Gait is normal.      Problem List Items Addressed This Visit       Benign essential hypertension    Relevant Medications    doxazosin (Cardura) 4 mg tablet    Other Relevant Orders    CBC and Auto Differential    Hemoglobin A1C    Dysmetabolic syndrome X    Relevant Orders    Hemoglobin A1C    Hyperlipidemia, mixed - Primary    Relevant Orders    CBC and Auto Differential    Comprehensive Metabolic Panel    Lipid Panel    CAD (coronary artery disease)    Relevant Orders    Hemoglobin A1C    Hemoglobin A1C    Obesity (BMI 30-39.9)    Nocturia    Relevant Medications    doxazosin (Cardura) 4 mg tablet    Other Relevant Orders    US prostate transrectal     POCT urinalysis dipstick manually resulted    Urine Culture    Benign prostatic hyperplasia with nocturia    Relevant Medications    doxazosin (Cardura) 4 mg tablet    Other Relevant Orders    US prostate transrectal    POCT urinalysis dipstick manually resulted    Urine Culture    Lipoma of abdominal wall     Other Visit Diagnoses       Vitamin D deficiency        Relevant Orders    Vitamin D 25-Hydroxy,Total (for eval of Vitamin D levels)             Orders Placed This Encounter   Procedures   • Urine Culture     Order Specific Question:   Release result to [email or web address]     Answer:   Immediate [1]   • US prostate transrectal     Standing Status:   Future     Standing Expiration Date:   12/26/2025     Order Specific Question:   Reason for exam:     Answer:   BPH sym[toms     Order Specific Question:   Radiologist to Determine Optimal Study     Answer:   Yes     Order Specific Question:   Release result to [id number]     Answer:   Immediate [1]     Order Specific Question:   Is this exam part of a Research Study? If Yes, link this order to the research study     Answer:   No   • CBC and Auto Differential     Standing Status:   Future     Standing Expiration Date:   12/26/2025     Order Specific Question:   Release result to [email or web address]     Answer:   Immediate   • Comprehensive Metabolic Panel     Standing Status:   Future     Standing Expiration Date:   12/26/2025     Order Specific Question:   Release result to [email or web address]     Answer:   Immediate   • Lipid Panel     fasting     Standing Status:   Future     Standing Expiration Date:   12/26/2025     Order Specific Question:   Release result to [email or web address]     Answer:   Immediate [1]   • Hemoglobin A1C     Standing Status:   Future     Standing Expiration Date:   12/26/2025     Order Specific Question:   Release result to [email or web address]     Answer:   Immediate   • Vitamin D 25-Hydroxy,Total (for eval of Vitamin D levels)     Standing Status:   Future     Standing Expiration Date:   12/26/2025      Order Specific Question:   [email or web address]     Answer:   Immediate   • POCT urinalysis dipstick manually resulted     Order Specific Question:   Release result to [email or web address]     Answer:   Immediate [1]        Lab Results   Component Value Date    WBC 6.4 06/28/2024    HGB 14.9 06/28/2024    HCT 44.1 06/28/2024     06/28/2024    CHOL 159 06/28/2024    TRIG 105 06/28/2024    HDL 43.2 06/28/2024    ALT 20 06/28/2024    AST 23 06/28/2024     06/28/2024    K 4.2 06/28/2024     06/28/2024    CREATININE 0.85 06/28/2024    BUN 20 06/28/2024    CO2 25 06/28/2024    TSH 0.95 11/30/2022    PSA 0.35 06/28/2024    HGBA1C 6.0 (H) 06/28/2024     Lab Results   Component Value Date    CHOL 159 06/28/2024    LDLCALC 95 06/28/2024    CHHDL 3.7 06/28/2024       No results found.

## 2024-12-27 LAB — BACTERIA UR CULT: NORMAL

## 2024-12-27 NOTE — RESULT ENCOUNTER NOTE
Urine culture does not show any evidence of infection.  Patient should continue the same management as we discussed. We will review the results in detail during office follow-up and please advise patient to keep the follow-up appointment as scheduled.

## 2024-12-31 DIAGNOSIS — E78.2 HYPERLIPIDEMIA, MIXED: ICD-10-CM

## 2025-01-09 RX ORDER — ATORVASTATIN CALCIUM 80 MG/1
80 TABLET, FILM COATED ORAL NIGHTLY
Qty: 90 TABLET | Refills: 1 | Status: SHIPPED | OUTPATIENT
Start: 2025-01-09

## 2025-04-01 LAB
25(OH)D3+25(OH)D2 SERPL-MCNC: 41 NG/ML (ref 30–100)
ALBUMIN SERPL-MCNC: 4.4 G/DL (ref 3.6–5.1)
ALBUMIN SERPL-MCNC: 4.4 G/DL (ref 3.6–5.1)
ALP SERPL-CCNC: 80 U/L (ref 35–144)
ALT SERPL-CCNC: 23 U/L (ref 9–46)
ANION GAP SERPL CALCULATED.4IONS-SCNC: 4 MMOL/L (CALC) (ref 7–17)
AST SERPL-CCNC: 24 U/L (ref 10–35)
BASOPHILS # BLD AUTO: 92 CELLS/UL (ref 0–200)
BASOPHILS NFR BLD AUTO: 1.5 %
BILIRUB SERPL-MCNC: 0.7 MG/DL (ref 0.2–1.2)
BUN SERPL-MCNC: 16 MG/DL (ref 7–25)
BUN SERPL-MCNC: 17 MG/DL (ref 7–25)
BUN/CREAT SERPL: ABNORMAL (CALC) (ref 6–22)
CALCIUM SERPL-MCNC: 9.2 MG/DL (ref 8.6–10.3)
CALCIUM SERPL-MCNC: 9.4 MG/DL (ref 8.6–10.3)
CHLORIDE SERPL-SCNC: 105 MMOL/L (ref 98–110)
CHLORIDE SERPL-SCNC: 106 MMOL/L (ref 98–110)
CHOLEST SERPL-MCNC: 163 MG/DL
CHOLEST SERPL-MCNC: 166 MG/DL
CHOLEST/HDLC SERPL: 3.8 (CALC)
CHOLEST/HDLC SERPL: 3.9 (CALC)
CO2 SERPL-SCNC: 30 MMOL/L (ref 20–32)
CO2 SERPL-SCNC: 31 MMOL/L (ref 20–32)
CREAT SERPL-MCNC: 0.84 MG/DL (ref 0.7–1.35)
CREAT SERPL-MCNC: 0.9 MG/DL (ref 0.7–1.35)
EGFRCR SERPLBLD CKD-EPI 2021: 93 ML/MIN/1.73M2
EGFRCR SERPLBLD CKD-EPI 2021: 95 ML/MIN/1.73M2
EOSINOPHIL # BLD AUTO: 238 CELLS/UL (ref 15–500)
EOSINOPHIL NFR BLD AUTO: 3.9 %
ERYTHROCYTE [DISTWIDTH] IN BLOOD BY AUTOMATED COUNT: 12.4 % (ref 11–15)
EST. AVERAGE GLUCOSE BLD GHB EST-MCNC: 128 MG/DL
EST. AVERAGE GLUCOSE BLD GHB EST-MCNC: 128 MG/DL
EST. AVERAGE GLUCOSE BLD GHB EST-SCNC: 7.1 MMOL/L
EST. AVERAGE GLUCOSE BLD GHB EST-SCNC: 7.1 MMOL/L
GLUCOSE SERPL-MCNC: 111 MG/DL (ref 65–99)
GLUCOSE SERPL-MCNC: 114 MG/DL (ref 65–99)
HBA1C MFR BLD: 6.1 % OF TOTAL HGB
HBA1C MFR BLD: 6.1 % OF TOTAL HGB
HCT VFR BLD AUTO: 46 % (ref 38.5–50)
HDLC SERPL-MCNC: 43 MG/DL
HDLC SERPL-MCNC: 43 MG/DL
HGB BLD-MCNC: 15 G/DL (ref 13.2–17.1)
LDLC SERPL CALC-MCNC: 102 MG/DL (CALC)
LDLC SERPL CALC-MCNC: 99 MG/DL (CALC)
LYMPHOCYTES # BLD AUTO: 1934 CELLS/UL (ref 850–3900)
LYMPHOCYTES NFR BLD AUTO: 31.7 %
MCH RBC QN AUTO: 30 PG (ref 27–33)
MCHC RBC AUTO-ENTMCNC: 32.6 G/DL (ref 32–36)
MCV RBC AUTO: 92 FL (ref 80–100)
MONOCYTES # BLD AUTO: 531 CELLS/UL (ref 200–950)
MONOCYTES NFR BLD AUTO: 8.7 %
NEUTROPHILS # BLD AUTO: 3306 CELLS/UL (ref 1500–7800)
NEUTROPHILS NFR BLD AUTO: 54.2 %
NONHDLC SERPL-MCNC: 120 MG/DL (CALC)
NONHDLC SERPL-MCNC: 123 MG/DL (CALC)
PHOSPHATE SERPL-MCNC: 3.2 MG/DL (ref 2.1–4.3)
PLATELET # BLD AUTO: 211 THOUSAND/UL (ref 140–400)
PMV BLD REES-ECKER: 10.5 FL (ref 7.5–12.5)
POTASSIUM SERPL-SCNC: 4.8 MMOL/L (ref 3.5–5.3)
POTASSIUM SERPL-SCNC: 4.8 MMOL/L (ref 3.5–5.3)
PROT SERPL-MCNC: 7 G/DL (ref 6.1–8.1)
RBC # BLD AUTO: 5 MILLION/UL (ref 4.2–5.8)
SODIUM SERPL-SCNC: 141 MMOL/L (ref 135–146)
SODIUM SERPL-SCNC: 142 MMOL/L (ref 135–146)
TRIGL SERPL-MCNC: 117 MG/DL
TRIGL SERPL-MCNC: 117 MG/DL
WBC # BLD AUTO: 6.1 THOUSAND/UL (ref 3.8–10.8)

## 2025-04-20 DIAGNOSIS — F40.9 PHOBIA, UNSPECIFIED TYPE: ICD-10-CM

## 2025-04-21 ENCOUNTER — OFFICE VISIT (OUTPATIENT)
Dept: PRIMARY CARE | Facility: CLINIC | Age: 70
End: 2025-04-21
Payer: MEDICARE

## 2025-04-21 ENCOUNTER — APPOINTMENT (OUTPATIENT)
Dept: CARDIOLOGY | Facility: CLINIC | Age: 70
End: 2025-04-21
Payer: MEDICARE

## 2025-04-21 VITALS
BODY MASS INDEX: 37.03 KG/M2 | SYSTOLIC BLOOD PRESSURE: 120 MMHG | DIASTOLIC BLOOD PRESSURE: 70 MMHG | HEIGHT: 69 IN | WEIGHT: 250 LBS | HEART RATE: 60 BPM

## 2025-04-21 VITALS
BODY MASS INDEX: 37.03 KG/M2 | HEIGHT: 69 IN | TEMPERATURE: 96.5 F | HEART RATE: 60 BPM | DIASTOLIC BLOOD PRESSURE: 70 MMHG | SYSTOLIC BLOOD PRESSURE: 120 MMHG | WEIGHT: 250 LBS

## 2025-04-21 DIAGNOSIS — G47.33 OBSTRUCTIVE SLEEP APNEA SYNDROME: ICD-10-CM

## 2025-04-21 DIAGNOSIS — Z87.891 FORMER SMOKER: ICD-10-CM

## 2025-04-21 DIAGNOSIS — I10 BENIGN ESSENTIAL HYPERTENSION: ICD-10-CM

## 2025-04-21 DIAGNOSIS — E78.2 HYPERLIPIDEMIA, MIXED: ICD-10-CM

## 2025-04-21 DIAGNOSIS — N40.1 BENIGN PROSTATIC HYPERPLASIA WITH NOCTURIA: ICD-10-CM

## 2025-04-21 DIAGNOSIS — Z00.00 ROUTINE GENERAL MEDICAL EXAMINATION AT HEALTH CARE FACILITY: Primary | ICD-10-CM

## 2025-04-21 DIAGNOSIS — E66.9 OBESITY (BMI 30-39.9): ICD-10-CM

## 2025-04-21 DIAGNOSIS — R35.1 BENIGN PROSTATIC HYPERPLASIA WITH NOCTURIA: ICD-10-CM

## 2025-04-21 DIAGNOSIS — F40.9 PHOBIA, UNSPECIFIED TYPE: ICD-10-CM

## 2025-04-21 DIAGNOSIS — Z12.5 PROSTATE CANCER SCREENING: ICD-10-CM

## 2025-04-21 DIAGNOSIS — I25.10 CORONARY ARTERY DISEASE INVOLVING NATIVE CORONARY ARTERY OF NATIVE HEART WITHOUT ANGINA PECTORIS: ICD-10-CM

## 2025-04-21 DIAGNOSIS — R35.1 NOCTURIA: ICD-10-CM

## 2025-04-21 DIAGNOSIS — Z95.5 STENTED CORONARY ARTERY: ICD-10-CM

## 2025-04-21 DIAGNOSIS — E88.810 DYSMETABOLIC SYNDROME X: ICD-10-CM

## 2025-04-21 DIAGNOSIS — R73.03 PREDIABETES: ICD-10-CM

## 2025-04-21 PROCEDURE — 3074F SYST BP LT 130 MM HG: CPT | Performed by: INTERNAL MEDICINE

## 2025-04-21 PROCEDURE — 1126F AMNT PAIN NOTED NONE PRSNT: CPT | Performed by: INTERNAL MEDICINE

## 2025-04-21 PROCEDURE — 1036F TOBACCO NON-USER: CPT | Performed by: INTERNAL MEDICINE

## 2025-04-21 PROCEDURE — 1123F ACP DISCUSS/DSCN MKR DOCD: CPT | Performed by: INTERNAL MEDICINE

## 2025-04-21 PROCEDURE — G0439 PPPS, SUBSEQ VISIT: HCPCS | Performed by: INTERNAL MEDICINE

## 2025-04-21 PROCEDURE — 1159F MED LIST DOCD IN RCRD: CPT | Performed by: INTERNAL MEDICINE

## 2025-04-21 PROCEDURE — 3078F DIAST BP <80 MM HG: CPT | Performed by: INTERNAL MEDICINE

## 2025-04-21 PROCEDURE — 1160F RVW MEDS BY RX/DR IN RCRD: CPT | Performed by: INTERNAL MEDICINE

## 2025-04-21 PROCEDURE — 99213 OFFICE O/P EST LOW 20 MIN: CPT | Performed by: INTERNAL MEDICINE

## 2025-04-21 PROCEDURE — 3008F BODY MASS INDEX DOCD: CPT | Performed by: INTERNAL MEDICINE

## 2025-04-21 PROCEDURE — 1158F ADVNC CARE PLAN TLK DOCD: CPT | Performed by: INTERNAL MEDICINE

## 2025-04-21 RX ORDER — DOXAZOSIN 4 MG/1
4 TABLET ORAL NIGHTLY
Qty: 90 TABLET | Refills: 1 | Status: SHIPPED | OUTPATIENT
Start: 2025-04-21 | End: 2025-10-18

## 2025-04-21 RX ORDER — SERTRALINE HYDROCHLORIDE 50 MG/1
50 TABLET, FILM COATED ORAL 2 TIMES DAILY
Qty: 180 TABLET | Refills: 3 | OUTPATIENT
Start: 2025-04-21

## 2025-04-21 RX ORDER — SERTRALINE HYDROCHLORIDE 50 MG/1
75 TABLET, FILM COATED ORAL DAILY
Qty: 135 TABLET | Refills: 1 | Status: SHIPPED | OUTPATIENT
Start: 2025-04-21 | End: 2026-04-21

## 2025-04-21 RX ORDER — METFORMIN HYDROCHLORIDE 500 MG/1
500 TABLET ORAL
Qty: 180 TABLET | Refills: 1 | Status: SHIPPED | OUTPATIENT
Start: 2025-04-21 | End: 2026-05-26

## 2025-04-21 ASSESSMENT — PAIN SCALES - GENERAL: PAINLEVEL_OUTOF10: 0-NO PAIN

## 2025-04-21 ASSESSMENT — ENCOUNTER SYMPTOMS
OCCASIONAL FEELINGS OF UNSTEADINESS: 0
DIZZINESS: 0
RESPIRATORY NEGATIVE: 1
LOSS OF SENSATION IN FEET: 0
CONSTITUTIONAL NEGATIVE: 1
NEUROLOGICAL NEGATIVE: 1
DEPRESSION: 0
SHORTNESS OF BREATH: 0
CARDIOVASCULAR NEGATIVE: 1

## 2025-04-21 ASSESSMENT — COLUMBIA-SUICIDE SEVERITY RATING SCALE - C-SSRS
1. IN THE PAST MONTH, HAVE YOU WISHED YOU WERE DEAD OR WISHED YOU COULD GO TO SLEEP AND NOT WAKE UP?: NO
6. HAVE YOU EVER DONE ANYTHING, STARTED TO DO ANYTHING, OR PREPARED TO DO ANYTHING TO END YOUR LIFE?: NO
2. HAVE YOU ACTUALLY HAD ANY THOUGHTS OF KILLING YOURSELF?: NO
6. HAVE YOU EVER DONE ANYTHING, STARTED TO DO ANYTHING, OR PREPARED TO DO ANYTHING TO END YOUR LIFE?: NO
2. HAVE YOU ACTUALLY HAD ANY THOUGHTS OF KILLING YOURSELF?: NO
1. IN THE PAST MONTH, HAVE YOU WISHED YOU WERE DEAD OR WISHED YOU COULD GO TO SLEEP AND NOT WAKE UP?: NO

## 2025-04-21 NOTE — PROGRESS NOTES
Subjective :  Chief Complaint: Jayesh Metcalf is an 69 y.o. male here for an annual wellness visit and general medical care and f/u.     HPI:  History of Present Illness  Jayesh Metcalf is a 69-year-old male who presents for management of prediabetes and medication review.    He has a hemoglobin A1c of 6.1, which is the highest recorded, with previous levels as low as 5.6. He attributes weight gain and increased sedentary behavior to a lack of motivation and the absence of his girlfriend. He is concerned about his diet and weight, noting that he often eats one main meal in the evening and snacks throughout the day.    He is aware of potential gastrointestinal side effects of medications but is willing to try them. He inquires about newer injectable medications but is concerned about insurance coverage and potential side effects, including pancreatitis and thyroid cancer.    He takes sertraline 75 mg daily, achieved by splitting a 50 mg tablet and taking one and a half tablets.    He attends regular annual visits to a dermatologist for skin checks, where liver spots and skin tags are removed. His last eye exam was in March of the previous year, and he plans to have another in May. He recalls a colonoscopy approximately four years ago but is uncertain of the exact date and location.    He notes a decrease in physical activity, attributing it to gloomy weather and his age, though he plans to resume biking with improved weather. He does not work and has not traveled to [place] recently, where his girlfriend resides.    No family history of diabetes. He is aware of the importance of maintaining steady blood sugar levels to protect his heart and kidneys.    1.  Immunizations  Prevnar 20,   shingles, 2018 x 2  Tdap, 2023  RSV, 2023  FLU 10/2024  COVID 10/2024    2.  Colon screening 5/2021 follow-up in 10 years    3.  Eye exam pending 5/2025    4.  Hearing test not applicable    5.  Prostate 10/2024    6.  Living will defer  "to    7.  Fall prevention discussed    8.  Bone density reviewed    9.  Weight and gait discussed    10.  Skin care and dermatology yearly    11. DM discussed about prediabetes  Foot exam  eye exam  HbA1C  GFR    12. CAD follows cardiology  Cholestrol  Risk calculation  Calcium score    13. Lung screenig not applicable    14. Depression screen reviewed    15. Blood Screen deferred  HIV  RPR  Hep C         All systems reviewed and negative except for what was mentioned in the HPI    Objective   /70 (BP Location: Left arm, Patient Position: Sitting)   Pulse 60   Temp 35.8 °C (96.5 °F)   Ht 1.753 m (5' 9\")   Wt 113 kg (250 lb)   BMI 36.92 kg/m²       PHYSICAL EXAM  Moderate obese, well-nourished  with no apparent distress. Alert oriented  Mood is appropriate, memory is intact  Skin:  Normal turgor.  No rash.  Head:  Normocephalic, atraumatic.  Eyes:  Pupils are equal, round,.  No pallor of conjunctivae.  Mouth has moist oral mucosa.  Neck:  Supple.     No JVD.  No carotid bruit.  No thyromegaly. No cervical lymphadenopathy.  No clubbing  Chest:  Vesicular breathing Bilaterally good air entry and bilaterally clear to auscultation.  No wheezing.  No crackles.  Heart:  Regular rate and rhythm.  S1, S2 positive.  No murmur.  Abdomen:  Soft and nontender.  Bowel sounds are positive.  No organomegaly.  Bilaterally no pedal pitting edema.  Bilaterally 2+ dorsalis pedis pulses.    Left leg varicose veins noted but no significant dermatitis or other acute changes.  No calf tenderness. Homans sign is negative.  Neuro Exam: No focal signs. Gait is normal.  Imaging:  Imaging  No results found.    Cardiology, Vascular, and Other Imaging  No other imaging results found for the past 7 days       Labs reviewed:    Lab Results   Component Value Date    WBC 6.1 03/31/2025    HGB 15.0 03/31/2025    HCT 46.0 03/31/2025     03/31/2025    CHOL 163 03/31/2025    TRIG 117 03/31/2025    HDL 43 03/31/2025    ALT 23 03/31/2025 "    AST 24 03/31/2025     03/31/2025    K 4.8 03/31/2025     03/31/2025    CREATININE 0.84 03/31/2025    BUN 17 03/31/2025    CO2 31 03/31/2025    TSH 0.95 11/30/2022    PSA 0.35 06/28/2024    HGBA1C 6.1 (H) 03/31/2025       Past Medical, Surgical, and Family History reviewed and updated in chart.    I have reviewed and reconciled the medication list with the patient today. Current Medications[1]     List of current healthcare providers:  Patient Care Team:  Jefferson Ortez MD as PCP - General  Shun Brink PA-C as PCP - [facility] Attributed Provider  Maria Luisa Bryson MD as Cardiologist (Cardiology)     HRA:     Over the past 2 weeks, how often have you been bothered by any of the following problems?  Little interest or pleasure in doing things: Not at all  Feeling down, depressed, or hopeless: Not at all  Patient Health Questionnaire-2 Score: 0                           Assessment & Plan  Prediabetes  Hemoglobin A1c at 6.1% confirms prediabetes. Emphasized lifestyle modifications for weight management and glycemic control. Considered metformin for insulin sensitivity, discussed side effects. Discussed Ozempic for weight loss, noted cost and insurance issues, and potential risks. Agreed to trial metformin for six months.  - Initiate metformin 500 mg twice daily with meals, starting with once daily and increasing to twice daily.  - Reassess hemoglobin A1c in six months.  - Promote weight loss through dietary and exercise interventions.  - Consider newer injectable medications if diabetes diagnosis is confirmed and insurance coverage is available.    Depression  Currently on sertraline 75 mg daily, achieved by taking one and a half 50 mg tablets. Discussed inconvenience of tablet splitting.  - Continue sertraline 75 mg daily, achieved by taking one and a half 50 mg tablets.  - Send sertraline prescription to [facility] pharmacy for a 90-day supply.  - Request pharmacy assistance with tablet splitting if feasible.      Patient's preventive care was reviewed and discussed in detail.  He has coronary artery disease with a history of stent is stable and he will follow-up with the cardiologist today as scheduled.    His BPH is stable and he will continue the doxazosin which was refilled on this visit.    Assessment/Plan :  Problem List Items Addressed This Visit       Benign essential hypertension    Relevant Medications    doxazosin (Cardura) 4 mg tablet    Dysmetabolic syndrome X                  Relevant Medications    metFORMIN (Glucophage) 500 mg tablet    Hyperlipidemia, mixed                  Phobia                  Relevant Medications    sertraline (Zoloft) 50 mg tablet    Sleep apnea                  Stented coronary artery                  Obesity (BMI 30-39.9)                  Former smoker                  Nocturia    Relevant Medications    doxazosin (Cardura) 4 mg tablet    Benign prostatic hyperplasia with nocturia    Relevant Medications    doxazosin (Cardura) 4 mg tablet    Other Relevant Orders    Prostate Specific Antigen, Screen     Other Visit Diagnoses         Routine general medical examination at health care facility    -  Primary    Relevant Orders    1 Year Follow Up In Primary Care - Wellness Exam      Prediabetes        Relevant Medications    metFORMIN (Glucophage) 500 mg tablet    Other Relevant Orders    Hemoglobin A1C      Prostate cancer screening              The following health maintenance schedule was reviewed with the patient and provided in printed form in the after visit summary:  Health Maintenance   Topic Date Due    Diabetes: Urine Protein Screening  Never done    Colorectal Cancer Screening  Never done    Diabetes: Retinopathy Screening  Never done    Hepatitis C Screening  Never done    Abdominal Aortic Aneurysm (AAA) Screening  Never done    COVID-19 Vaccine (8 - 2024-25 season) 04/09/2025    Diabetes: Hemoglobin A1C  06/30/2025    Lipid Panel  03/31/2026    Medicare Annual Wellness  Visit (AWV)  04/22/2026    DTaP/Tdap/Td Vaccines (3 - Td or Tdap) 06/08/2033    RSV High Risk: (Elderly (60+) or Pregnant Population)  Completed    Influenza Vaccine  Completed    Pneumococcal Vaccine  Completed    Zoster Vaccines  Completed    HIB Vaccines  Aged Out    Hepatitis B Vaccines  Aged Out    IPV Vaccines  Aged Out    Hepatitis A Vaccines  Aged Out    Meningococcal Vaccine  Aged Out    Rotavirus Vaccines  Aged Out    HPV Vaccines  Aged Out    Welcome to Medicare Visit  Discontinued       Advance Care Planning   Detail is deferred.  Orders Placed This Encounter   Procedures    Hemoglobin A1C     Standing Status:   Future     Expected Date:   7/21/2025     Expiration Date:   4/21/2026     Release result to [facility]:   Immediate    Prostate Specific Antigen, Screen     Standing Status:   Future     Number of Occurrences:   1     Expected Date:   4/21/2025     Expiration Date:   4/21/2026     Release result to [facility]:   Immediate [1]       Continue current medications as listed  Follow up in 6 months         This medical note was created with the assistance of artificial intelligence (AI) for documentation purposes. The content has been reviewed and confirmed by the healthcare provider for accuracy and completeness. Patient consented to the use of audio recording and use of AI during their visit.               [1]   Current Outpatient Medications:     aspirin 81 mg chewable tablet, Chew 1 tablet (81 mg) once daily., Disp: , Rfl:     atorvastatin (Lipitor) 80 mg tablet, TAKE 1 TABLET BY MOUTH EVERYDAY AT BEDTIME, Disp: 90 tablet, Rfl: 1    glucosamine-chondroitin 500-400 mg tablet, Take 1 tablet by mouth once daily., Disp: , Rfl:     ibuprofen (Motrin) capsule, Take by mouth., Disp: , Rfl:     lisinopril 10 mg tablet, TAKE 1 TABLET BY MOUTH EVERY DAY, Disp: 90 tablet, Rfl: 3    metoprolol tartrate (Lopressor) 50 mg tablet, TAKE 1 TABLET DAILY, Disp: 90 tablet, Rfl: 3    multivitamin with minerals iron-free  (Centrum Silver), Take 1 tablet by mouth once daily., Disp: , Rfl:     omega 3-dha-epa-fish oil (Fish OiL) 1,200 (144-216) mg capsule, Take by mouth., Disp: , Rfl:     cyclobenzaprine (Flexeril) 10 mg tablet, Take 1 tablet (10 mg) by mouth as needed at bedtime for muscle spasms., Disp: 20 tablet, Rfl: 0    doxazosin (Cardura) 4 mg tablet, Take 1 tablet (4 mg) by mouth once daily at bedtime., Disp: 90 tablet, Rfl: 1    metFORMIN (Glucophage) 500 mg tablet, Take 1 tablet (500 mg) by mouth 2 times daily (morning and late afternoon)., Disp: 180 tablet, Rfl: 1    sertraline (Zoloft) 50 mg tablet, Take 1.5 tablets (75 mg) by mouth once daily., Disp: 135 tablet, Rfl: 1

## 2025-04-21 NOTE — PROGRESS NOTES
Referred by Dr. Hahn ref. provider found provider found for   Chief Complaint   Patient presents with    Follow-up     9m follow up        History of Present Illness  Jayesh Metcalf is a 69 y.o. year old male patient is here for follow-up.  Doing well from a cardiac standpoint no complaint no symptoms of chest pain or shortness of breath.  Blood pressure is adequately controlled.  Denies any symptoms of palpitations syncope or presyncope.  Discussed with the patient we will continue medication will call for any problem and follow-up as scheduled    Past Medical History  Medical History[1]    Social History  Social History[2]    Family History   Family History[3]    Review of Systems  As per HPI, all other systems reviewed and negative.    Allergies:  RX Allergies[4]     Outpatient Medications:  Current Outpatient Medications   Medication Instructions    aspirin 81 mg chewable tablet 1 tablet, Daily    atorvastatin (LIPITOR) 80 mg, oral, Nightly    cyclobenzaprine (FLEXERIL) 10 mg, oral, Nightly PRN    doxazosin (CARDURA) 4 mg, oral, Nightly    glucosamine-chondroitin 500-400 mg tablet 1 tablet, Daily    ibuprofen (Motrin) capsule Take by mouth.    lisinopril 10 mg, oral, Daily    metFORMIN (GLUCOPHAGE) 500 mg, oral, 2 times daily (morning and late afternoon)    metoprolol tartrate (LOPRESSOR) 50 mg, oral, Daily    multivitamin with minerals iron-free (Centrum Silver) 1 tablet, Daily    omega 3-dha-epa-fish oil (Fish OiL) 1,200 (144-216) mg capsule Take by mouth.    sertraline (ZOLOFT) 75 mg, oral, Daily         Vitals:  Vitals:    04/21/25 1425   BP: 120/70   Pulse: 60       Physical Exam:  Physical Exam  Constitutional:       Appearance: He is obese.   Neck:      Vascular: No carotid bruit.   Cardiovascular:      Rate and Rhythm: Normal rate and regular rhythm.      Pulses: Normal pulses.      Heart sounds: No murmur heard.  Pulmonary:      Effort: Pulmonary effort is normal.      Breath sounds: Normal breath sounds.    Musculoskeletal:         General: Normal range of motion.      Cervical back: Normal range of motion.   Skin:     General: Skin is warm and dry.   Neurological:      General: No focal deficit present.      Mental Status: He is alert and oriented to person, place, and time. Mental status is at baseline.         Review of Systems   Constitutional: Negative.    Respiratory: Negative.  Negative for shortness of breath.    Cardiovascular: Negative.  Negative for chest pain.   Neurological: Negative.  Negative for dizziness.         Assessment/Plan   Problem List Items Addressed This Visit           ICD-10-CM    Benign essential hypertension I10    Hyperlipidemia, mixed E78.2    CAD (coronary artery disease) I25.10    Obesity (BMI 30-39.9) E66.9    Former smoker Z87.891       Scribe Attestation  By signing my name below, Thu GRIFFITH LPN  , Scribe   attest that this documentation has been prepared under the direction and in the presence of Maria Luisa Bryson MD.      Maria Luisa Bryson MD [facility]  Interventional Cardiology   of [facility]     Thank you for allowing me to participate in the care of this patient. Please do not hesitate to contact me with any further questions or concerns.         [1]   Past Medical History:  Diagnosis Date    Atherosclerotic heart disease of native coronary artery without angina pectoris     Coronary artery disease    Heart disease     Ingrowing nail     IGTN (ingrowing toe nail)    Other insomnia     Secondary insomnia    Personal history of other diseases of the circulatory system     History of intermittent claudication    Personal history of other diseases of the circulatory system     History of orthostatic hypotension    Personal history of other diseases of the digestive system     History of gastroesophageal reflux (GERD)    Personal history of other diseases of the digestive system     History of IBS    Personal history of other diseases of the digestive  system     History of umbilical hernia    Personal history of other diseases of the musculoskeletal system and connective tissue     History of neck pain    Personal history of other specified conditions     History of diarrhea    Personal history of other specified conditions     History of fatigue    Personal history of other specified conditions     History of nocturia    Rash and other nonspecific skin eruption     Skin rash   [2]   Social History  Tobacco Use    Smoking status: Former     Current packs/day: 0.00     Types: Cigarettes     Quit date:      Years since quittin.3    Smokeless tobacco: Never   Vaping Use    Vaping status: Never Used   Substance Use Topics    Alcohol use: Yes    Drug use: Never   [3]   Family History  Problem Relation Name Age of Onset    Kidney failure Mother      Other (CABG) Father     [4] No Known Allergies

## 2025-06-19 DIAGNOSIS — I10 BENIGN ESSENTIAL HYPERTENSION: ICD-10-CM

## 2025-06-21 DIAGNOSIS — I10 ESSENTIAL (PRIMARY) HYPERTENSION: ICD-10-CM

## 2025-06-21 DIAGNOSIS — I10 BENIGN ESSENTIAL HYPERTENSION: ICD-10-CM

## 2025-06-23 RX ORDER — LISINOPRIL 10 MG/1
10 TABLET ORAL DAILY
Qty: 90 TABLET | Refills: 3 | Status: SHIPPED | OUTPATIENT
Start: 2025-06-23

## 2025-06-25 RX ORDER — METOPROLOL TARTRATE 50 MG/1
50 TABLET ORAL DAILY
Qty: 90 TABLET | Refills: 3 | Status: SHIPPED | OUTPATIENT
Start: 2025-06-25

## 2025-07-21 DIAGNOSIS — R73.03 PREDIABETES: ICD-10-CM

## 2025-08-20 ENCOUNTER — TELEPHONE (OUTPATIENT)
Dept: CARDIOLOGY | Facility: CLINIC | Age: 70
End: 2025-08-20
Payer: MEDICARE

## 2025-08-21 ENCOUNTER — LAB (OUTPATIENT)
Dept: LAB | Facility: HOSPITAL | Age: 70
End: 2025-08-21
Payer: MEDICARE

## 2025-08-21 LAB — PSA SERPL-MCNC: 0.36 NG/ML

## 2025-08-22 LAB
EST. AVERAGE GLUCOSE BLD GHB EST-MCNC: 131 MG/DL
EST. AVERAGE GLUCOSE BLD GHB EST-SCNC: 7.3 MMOL/L
HBA1C MFR BLD: 6.2 %

## 2025-08-25 ENCOUNTER — TELEPHONE (OUTPATIENT)
Dept: PRIMARY CARE | Facility: CLINIC | Age: 70
End: 2025-08-25
Payer: MEDICARE

## 2025-08-25 DIAGNOSIS — Z95.5 STENTED CORONARY ARTERY: ICD-10-CM

## 2025-08-25 DIAGNOSIS — E78.2 HYPERLIPIDEMIA, MIXED: Primary | ICD-10-CM

## 2025-08-25 DIAGNOSIS — I10 BENIGN ESSENTIAL HYPERTENSION: ICD-10-CM

## 2025-08-25 DIAGNOSIS — E88.810 DYSMETABOLIC SYNDROME X: ICD-10-CM

## 2025-10-21 ENCOUNTER — APPOINTMENT (OUTPATIENT)
Dept: PRIMARY CARE | Facility: CLINIC | Age: 70
End: 2025-10-21
Payer: MEDICARE

## 2026-01-05 ENCOUNTER — APPOINTMENT (OUTPATIENT)
Dept: CARDIOLOGY | Facility: CLINIC | Age: 71
End: 2026-01-05
Payer: MEDICARE

## (undated) DEVICE — EXTENSION SET, IV, 30 IN, LUER LOCK

## (undated) DEVICE — STRIP, SKIN CLOSURE, STERI STRIP, REINFORCED, 0.5 X 4 IN

## (undated) DEVICE — SOLUTION, IRRIGATION, STERILE WATER, 1000 ML, POUR BOTTLE

## (undated) DEVICE — NEEDLE, HYPODERMIC, SPECIALTY, REGULAR WALL, SHORT BEVEL, 18 G X 1.5 IN

## (undated) DEVICE — BANDAGE, COFLEX, 6 X 5 YDS, FOAM TAN, STERILE, LF

## (undated) DEVICE — DRESSING, GAUZE, SUPER KERLIX, 6X6

## (undated) DEVICE — BLADE, STRYKER, 4.0MM, AGG PLUS SHVBLD ULTMT

## (undated) DEVICE — SOLUTION, IRRIGATION, USP, SODIUM CHLORIDE 0.9%, 3000 ML, BAG

## (undated) DEVICE — BANDAGE, ESMARK, 6 IN X 12 FT

## (undated) DEVICE — DRAPE, SHEET, THREE QUARTER, FAN FOLD, 57 X 77 IN

## (undated) DEVICE — Device

## (undated) DEVICE — SOLUTION, IRRIGATION, SODIUM CHLORIDE 0.9%, 1000 ML, POUR BOTTLE

## (undated) DEVICE — TUBESET, INTERMEDIARY

## (undated) DEVICE — TUBING, NFLOW, FMS VUE, SNGL USE, DISP, LF

## (undated) DEVICE — WAND, VAPOR, P50, W/ HAND CONTROL

## (undated) DEVICE — TOWEL PACK, STERILE, 4/PACK, BLUE

## (undated) DEVICE — DRESSING, ABDOMINAL, WET PRUF, TENDERSORB, 5 X 9 IN, STERILE

## (undated) DEVICE — BANDAGE, ELASTIC, PREMIUM, SELF-CLOSE, 6 IN X 5.5 YD, STERILE

## (undated) DEVICE — DRESSING, NON-ADHERENT, 3 X 3 IN, STERILE

## (undated) DEVICE — PADDING, UNDERCAST, WEBRIL, 6 IN X 4 YD, REG, NS